# Patient Record
Sex: FEMALE | Race: BLACK OR AFRICAN AMERICAN | NOT HISPANIC OR LATINO | URBAN - METROPOLITAN AREA
[De-identification: names, ages, dates, MRNs, and addresses within clinical notes are randomized per-mention and may not be internally consistent; named-entity substitution may affect disease eponyms.]

---

## 2018-01-05 ENCOUNTER — OFFICE VISIT CONVERTED (OUTPATIENT)
Dept: SURGERY | Facility: CLINIC | Age: 38
End: 2018-01-05
Attending: SURGERY

## 2018-01-19 ENCOUNTER — OFFICE VISIT CONVERTED (OUTPATIENT)
Dept: SURGERY | Facility: CLINIC | Age: 38
End: 2018-01-19
Attending: SURGERY

## 2018-02-05 ENCOUNTER — OFFICE VISIT CONVERTED (OUTPATIENT)
Dept: SURGERY | Facility: CLINIC | Age: 38
End: 2018-02-05
Attending: SURGERY

## 2018-02-19 ENCOUNTER — OFFICE VISIT CONVERTED (OUTPATIENT)
Dept: SURGERY | Facility: CLINIC | Age: 38
End: 2018-02-19
Attending: SURGERY

## 2018-02-19 ENCOUNTER — CONVERSION ENCOUNTER (OUTPATIENT)
Dept: SURGERY | Facility: CLINIC | Age: 38
End: 2018-02-19

## 2018-03-05 ENCOUNTER — OFFICE VISIT CONVERTED (OUTPATIENT)
Dept: SURGERY | Facility: CLINIC | Age: 38
End: 2018-03-05
Attending: SURGERY

## 2018-04-02 ENCOUNTER — OFFICE VISIT CONVERTED (OUTPATIENT)
Dept: SURGERY | Facility: CLINIC | Age: 38
End: 2018-04-02
Attending: SURGERY

## 2018-04-02 ENCOUNTER — CONVERSION ENCOUNTER (OUTPATIENT)
Dept: SURGERY | Facility: CLINIC | Age: 38
End: 2018-04-02

## 2019-02-01 ENCOUNTER — OFFICE VISIT CONVERTED (OUTPATIENT)
Dept: PLASTIC SURGERY | Facility: CLINIC | Age: 39
End: 2019-02-01
Attending: PLASTIC SURGERY

## 2019-03-12 ENCOUNTER — OFFICE VISIT CONVERTED (OUTPATIENT)
Dept: SURGERY | Facility: CLINIC | Age: 39
End: 2019-03-12
Attending: SURGERY

## 2019-03-18 ENCOUNTER — OFFICE VISIT CONVERTED (OUTPATIENT)
Dept: SURGERY | Facility: CLINIC | Age: 39
End: 2019-03-18
Attending: SURGERY

## 2019-05-20 ENCOUNTER — OFFICE VISIT CONVERTED (OUTPATIENT)
Dept: PLASTIC SURGERY | Facility: CLINIC | Age: 39
End: 2019-05-20
Attending: PLASTIC SURGERY

## 2019-06-03 LAB — HCG UR QL: NEGATIVE

## 2019-06-04 ENCOUNTER — HOSPITAL ENCOUNTER (OUTPATIENT)
Dept: PERIOP | Facility: HOSPITAL | Age: 39
Setting detail: HOSPITAL OUTPATIENT SURGERY
Discharge: HOME | End: 2019-06-04
Attending: PLASTIC SURGERY

## 2019-06-05 ENCOUNTER — CONVERSION ENCOUNTER (OUTPATIENT)
Dept: PLASTIC SURGERY | Facility: CLINIC | Age: 39
End: 2019-06-05
Attending: PLASTIC SURGERY

## 2019-06-07 ENCOUNTER — OFFICE VISIT CONVERTED (OUTPATIENT)
Dept: PLASTIC SURGERY | Facility: CLINIC | Age: 39
End: 2019-06-07
Attending: PLASTIC SURGERY

## 2019-06-10 ENCOUNTER — OFFICE VISIT CONVERTED (OUTPATIENT)
Dept: PLASTIC SURGERY | Facility: CLINIC | Age: 39
End: 2019-06-10
Attending: PLASTIC SURGERY

## 2019-06-14 ENCOUNTER — CONVERSION ENCOUNTER (OUTPATIENT)
Dept: PLASTIC SURGERY | Facility: CLINIC | Age: 39
End: 2019-06-14

## 2019-06-14 ENCOUNTER — OFFICE VISIT CONVERTED (OUTPATIENT)
Dept: PLASTIC SURGERY | Facility: CLINIC | Age: 39
End: 2019-06-14
Attending: PLASTIC SURGERY

## 2019-07-12 ENCOUNTER — OFFICE VISIT CONVERTED (OUTPATIENT)
Dept: PLASTIC SURGERY | Facility: CLINIC | Age: 39
End: 2019-07-12
Attending: PLASTIC SURGERY

## 2019-08-27 ENCOUNTER — HOSPITAL ENCOUNTER (OUTPATIENT)
Dept: LAB | Facility: HOSPITAL | Age: 39
Discharge: HOME | End: 2019-08-27
Attending: FAMILY MEDICINE

## 2019-08-27 LAB
ALBUMIN SERPL-MCNC: 4.1 G/DL (ref 3.5–5)
ALBUMIN/GLOB SERPL: 0.9 {RATIO} (ref 1.4–2.6)
ALP SERPL-CCNC: 64 U/L (ref 42–98)
ALT SERPL-CCNC: 9 U/L (ref 10–40)
ANION GAP SERPL CALC-SCNC: 14 MMOL/L (ref 8–19)
AST SERPL-CCNC: 12 U/L (ref 15–50)
BASOPHILS # BLD AUTO: 0.05 10*3/UL (ref 0–0.2)
BASOPHILS NFR BLD AUTO: 0.5 % (ref 0–3)
BILIRUB SERPL-MCNC: 0.23 MG/DL (ref 0.2–1.3)
BUN SERPL-MCNC: 10 MG/DL (ref 5–25)
BUN/CREAT SERPL: 11 {RATIO} (ref 6–20)
CALCIUM SERPL-MCNC: 9.2 MG/DL (ref 8.7–10.4)
CHLORIDE SERPL-SCNC: 102 MMOL/L (ref 99–111)
CHOLEST SERPL-MCNC: 178 MG/DL (ref 107–200)
CHOLEST/HDLC SERPL: 4.7 {RATIO} (ref 3–6)
CONV ABS IMM GRAN: 0.02 10*3/UL (ref 0–0.2)
CONV CO2: 24 MMOL/L (ref 22–32)
CONV IMMATURE GRAN: 0.2 % (ref 0–1.8)
CONV TOTAL PROTEIN: 8.9 G/DL (ref 6.3–8.2)
CREAT UR-MCNC: 0.89 MG/DL (ref 0.5–0.9)
DEPRECATED RDW RBC AUTO: 41.1 FL (ref 36.4–46.3)
EOSINOPHIL # BLD AUTO: 0.25 10*3/UL (ref 0–0.7)
EOSINOPHIL # BLD AUTO: 2.7 % (ref 0–7)
ERYTHROCYTE [DISTWIDTH] IN BLOOD BY AUTOMATED COUNT: 13.3 % (ref 11.7–14.4)
GFR SERPLBLD BASED ON 1.73 SQ M-ARVRAT: >60 ML/MIN/{1.73_M2}
GLOBULIN UR ELPH-MCNC: 4.8 G/DL (ref 2–3.5)
GLUCOSE SERPL-MCNC: 83 MG/DL (ref 65–99)
HCT VFR BLD AUTO: 34.5 % (ref 37–47)
HDLC SERPL-MCNC: 38 MG/DL (ref 40–60)
HGB BLD-MCNC: 11.2 G/DL (ref 12–16)
IRON SATN MFR SERPL: 21 % (ref 20–55)
IRON SERPL-MCNC: 75 UG/DL (ref 60–170)
LDLC SERPL CALC-MCNC: 124 MG/DL (ref 70–100)
LYMPHOCYTES # BLD AUTO: 3.53 10*3/UL (ref 1–5)
LYMPHOCYTES NFR BLD AUTO: 38 % (ref 20–45)
MCH RBC QN AUTO: 27.5 PG (ref 27–31)
MCHC RBC AUTO-ENTMCNC: 32.5 G/DL (ref 33–37)
MCV RBC AUTO: 84.8 FL (ref 81–99)
MONOCYTES # BLD AUTO: 0.68 10*3/UL (ref 0.2–1.2)
MONOCYTES NFR BLD AUTO: 7.3 % (ref 3–10)
NEUTROPHILS # BLD AUTO: 4.75 10*3/UL (ref 2–8)
NEUTROPHILS NFR BLD AUTO: 51.3 % (ref 30–85)
NRBC CBCN: 0 % (ref 0–0.7)
OSMOLALITY SERPL CALC.SUM OF ELEC: 280 MOSM/KG (ref 273–304)
PLATELET # BLD AUTO: 347 10*3/UL (ref 130–400)
PMV BLD AUTO: 10.1 FL (ref 9.4–12.3)
POTASSIUM SERPL-SCNC: 4 MMOL/L (ref 3.5–5.3)
RBC # BLD AUTO: 4.07 10*6/UL (ref 4.2–5.4)
SODIUM SERPL-SCNC: 136 MMOL/L (ref 135–147)
TIBC SERPL-MCNC: 363 UG/DL (ref 245–450)
TRANSFERRIN SERPL-MCNC: 254 MG/DL (ref 250–380)
TRIGL SERPL-MCNC: 78 MG/DL (ref 40–150)
VLDLC SERPL-MCNC: 16 MG/DL (ref 5–37)
WBC # BLD AUTO: 9.28 10*3/UL (ref 4.8–10.8)

## 2019-09-13 ENCOUNTER — CONVERSION ENCOUNTER (OUTPATIENT)
Dept: PLASTIC SURGERY | Facility: CLINIC | Age: 39
End: 2019-09-13

## 2019-09-13 ENCOUNTER — OFFICE VISIT CONVERTED (OUTPATIENT)
Dept: PLASTIC SURGERY | Facility: CLINIC | Age: 39
End: 2019-09-13
Attending: PLASTIC SURGERY

## 2019-09-22 ENCOUNTER — HOSPITAL ENCOUNTER (OUTPATIENT)
Dept: URGENT CARE | Facility: CLINIC | Age: 39
Discharge: HOME | End: 2019-09-22

## 2019-10-07 ENCOUNTER — OFFICE VISIT CONVERTED (OUTPATIENT)
Dept: SURGERY | Facility: CLINIC | Age: 39
End: 2019-10-07
Attending: SURGERY

## 2019-10-11 ENCOUNTER — HOSPITAL ENCOUNTER (OUTPATIENT)
Dept: URGENT CARE | Facility: CLINIC | Age: 39
Discharge: HOME | End: 2019-10-11

## 2019-10-18 ENCOUNTER — OFFICE VISIT CONVERTED (OUTPATIENT)
Dept: PLASTIC SURGERY | Facility: CLINIC | Age: 39
End: 2019-10-18
Attending: PLASTIC SURGERY

## 2019-10-31 ENCOUNTER — HOSPITAL ENCOUNTER (OUTPATIENT)
Dept: PERIOP | Facility: HOSPITAL | Age: 39
Setting detail: HOSPITAL OUTPATIENT SURGERY
Discharge: HOME | End: 2019-10-31
Attending: SURGERY

## 2019-10-31 LAB — HCG UR QL: NEGATIVE

## 2019-11-01 ENCOUNTER — HOSPITAL ENCOUNTER (OUTPATIENT)
Dept: INFUSION THERAPY | Facility: HOSPITAL | Age: 39
Setting detail: RECURRING SERIES
Discharge: HOME | End: 2019-11-30
Attending: SURGERY

## 2019-11-08 ENCOUNTER — OFFICE VISIT CONVERTED (OUTPATIENT)
Dept: SURGERY | Facility: CLINIC | Age: 39
End: 2019-11-08
Attending: SURGERY

## 2019-11-22 ENCOUNTER — OFFICE VISIT CONVERTED (OUTPATIENT)
Dept: SURGERY | Facility: CLINIC | Age: 39
End: 2019-11-22
Attending: SURGERY

## 2019-12-01 ENCOUNTER — HOSPITAL ENCOUNTER (OUTPATIENT)
Dept: INFUSION THERAPY | Facility: HOSPITAL | Age: 39
Setting detail: RECURRING SERIES
Discharge: HOME | End: 2019-12-31
Attending: SURGERY

## 2019-12-20 ENCOUNTER — OFFICE VISIT CONVERTED (OUTPATIENT)
Dept: SURGERY | Facility: CLINIC | Age: 39
End: 2019-12-20
Attending: SURGERY

## 2019-12-26 ENCOUNTER — HOSPITAL ENCOUNTER (OUTPATIENT)
Dept: PERIOP | Facility: HOSPITAL | Age: 39
Setting detail: HOSPITAL OUTPATIENT SURGERY
Discharge: HOME | End: 2019-12-26
Attending: SURGERY

## 2019-12-26 LAB — HCG UR QL: NEGATIVE

## 2020-01-01 ENCOUNTER — HOSPITAL ENCOUNTER (OUTPATIENT)
Dept: INFUSION THERAPY | Facility: HOSPITAL | Age: 40
Setting detail: RECURRING SERIES
Discharge: HOME | End: 2020-01-31
Attending: SURGERY

## 2020-01-10 ENCOUNTER — OFFICE VISIT CONVERTED (OUTPATIENT)
Dept: SURGERY | Facility: CLINIC | Age: 40
End: 2020-01-10
Attending: SURGERY

## 2020-01-13 ENCOUNTER — OFFICE VISIT CONVERTED (OUTPATIENT)
Dept: SURGERY | Facility: CLINIC | Age: 40
End: 2020-01-13
Attending: SURGERY

## 2020-01-21 ENCOUNTER — OFFICE VISIT CONVERTED (OUTPATIENT)
Dept: SURGERY | Facility: CLINIC | Age: 40
End: 2020-01-21
Attending: NURSE PRACTITIONER

## 2020-01-21 ENCOUNTER — HOSPITAL ENCOUNTER (OUTPATIENT)
Dept: SURGERY | Facility: CLINIC | Age: 40
Discharge: HOME | End: 2020-01-21
Attending: NURSE PRACTITIONER

## 2020-01-22 ENCOUNTER — CONVERSION ENCOUNTER (OUTPATIENT)
Dept: SURGERY | Facility: CLINIC | Age: 40
End: 2020-01-22

## 2020-01-23 LAB — BACTERIA SPEC AEROBE CULT: ABNORMAL

## 2020-02-03 ENCOUNTER — HOSPITAL ENCOUNTER (OUTPATIENT)
Dept: INFUSION THERAPY | Facility: HOSPITAL | Age: 40
Setting detail: RECURRING SERIES
Discharge: HOME | End: 2020-02-22
Attending: SURGERY

## 2020-02-07 ENCOUNTER — OFFICE VISIT CONVERTED (OUTPATIENT)
Dept: PLASTIC SURGERY | Facility: CLINIC | Age: 40
End: 2020-02-07
Attending: PLASTIC SURGERY

## 2020-02-07 ENCOUNTER — OFFICE VISIT CONVERTED (OUTPATIENT)
Dept: SURGERY | Facility: CLINIC | Age: 40
End: 2020-02-07
Attending: SURGERY

## 2020-02-14 ENCOUNTER — CONVERSION ENCOUNTER (OUTPATIENT)
Dept: SURGERY | Facility: CLINIC | Age: 40
End: 2020-02-14

## 2020-02-14 ENCOUNTER — OFFICE VISIT CONVERTED (OUTPATIENT)
Dept: SURGERY | Facility: CLINIC | Age: 40
End: 2020-02-14
Attending: SURGERY

## 2020-02-28 ENCOUNTER — OFFICE VISIT CONVERTED (OUTPATIENT)
Dept: SURGERY | Facility: CLINIC | Age: 40
End: 2020-02-28
Attending: SURGERY

## 2020-04-10 ENCOUNTER — OFFICE VISIT CONVERTED (OUTPATIENT)
Dept: SURGERY | Facility: CLINIC | Age: 40
End: 2020-04-10
Attending: SURGERY

## 2020-04-10 ENCOUNTER — CONVERSION ENCOUNTER (OUTPATIENT)
Dept: SURGERY | Facility: CLINIC | Age: 40
End: 2020-04-10

## 2020-06-12 ENCOUNTER — OFFICE VISIT CONVERTED (OUTPATIENT)
Dept: PLASTIC SURGERY | Facility: CLINIC | Age: 40
End: 2020-06-12
Attending: PLASTIC SURGERY

## 2020-06-12 ENCOUNTER — CONVERSION ENCOUNTER (OUTPATIENT)
Dept: PLASTIC SURGERY | Facility: CLINIC | Age: 40
End: 2020-06-12

## 2020-07-09 ENCOUNTER — CONVERSION ENCOUNTER (OUTPATIENT)
Dept: PLASTIC SURGERY | Facility: CLINIC | Age: 40
End: 2020-07-09

## 2020-07-09 ENCOUNTER — PROCEDURE VISIT CONVERTED (OUTPATIENT)
Dept: PLASTIC SURGERY | Facility: CLINIC | Age: 40
End: 2020-07-09
Attending: PLASTIC SURGERY

## 2020-07-23 ENCOUNTER — OFFICE VISIT CONVERTED (OUTPATIENT)
Dept: PLASTIC SURGERY | Facility: CLINIC | Age: 40
End: 2020-07-23
Attending: PLASTIC SURGERY

## 2020-07-23 ENCOUNTER — CONVERSION ENCOUNTER (OUTPATIENT)
Dept: PLASTIC SURGERY | Facility: CLINIC | Age: 40
End: 2020-07-23

## 2020-09-25 ENCOUNTER — OFFICE VISIT CONVERTED (OUTPATIENT)
Dept: PLASTIC SURGERY | Facility: CLINIC | Age: 40
End: 2020-09-25
Attending: PLASTIC SURGERY

## 2020-09-25 ENCOUNTER — CONVERSION ENCOUNTER (OUTPATIENT)
Dept: PLASTIC SURGERY | Facility: CLINIC | Age: 40
End: 2020-09-25

## 2020-10-23 ENCOUNTER — HOSPITAL ENCOUNTER (OUTPATIENT)
Dept: LAB | Facility: HOSPITAL | Age: 40
Discharge: HOME | End: 2020-10-23
Attending: FAMILY MEDICINE

## 2020-10-23 LAB
ALBUMIN SERPL-MCNC: 4 G/DL (ref 3.5–5)
ALBUMIN/GLOB SERPL: 0.9 {RATIO} (ref 1.4–2.6)
ALP SERPL-CCNC: 53 U/L (ref 42–98)
ALT SERPL-CCNC: 10 U/L (ref 10–40)
ANION GAP SERPL CALC-SCNC: 16 MMOL/L (ref 8–19)
AST SERPL-CCNC: 14 U/L (ref 15–50)
BASOPHILS # BLD AUTO: 0.04 10*3/UL (ref 0–0.2)
BASOPHILS NFR BLD AUTO: 0.5 % (ref 0–3)
BILIRUB SERPL-MCNC: 0.3 MG/DL (ref 0.2–1.3)
BUN SERPL-MCNC: 11 MG/DL (ref 5–25)
BUN/CREAT SERPL: 12 {RATIO} (ref 6–20)
CALCIUM SERPL-MCNC: 9.3 MG/DL (ref 8.7–10.4)
CHLORIDE SERPL-SCNC: 106 MMOL/L (ref 99–111)
CHOLEST SERPL-MCNC: 200 MG/DL (ref 107–200)
CHOLEST/HDLC SERPL: 5 {RATIO} (ref 3–6)
CONV ABS IMM GRAN: 0.01 10*3/UL (ref 0–0.2)
CONV CO2: 21 MMOL/L (ref 22–32)
CONV IMMATURE GRAN: 0.1 % (ref 0–1.8)
CONV TOTAL PROTEIN: 8.4 G/DL (ref 6.3–8.2)
CREAT UR-MCNC: 0.89 MG/DL (ref 0.5–0.9)
DEPRECATED RDW RBC AUTO: 42.1 FL (ref 36.4–46.3)
EOSINOPHIL # BLD AUTO: 0.2 10*3/UL (ref 0–0.7)
EOSINOPHIL # BLD AUTO: 2.5 % (ref 0–7)
ERYTHROCYTE [DISTWIDTH] IN BLOOD BY AUTOMATED COUNT: 13.5 % (ref 11.7–14.4)
GFR SERPLBLD BASED ON 1.73 SQ M-ARVRAT: >60 ML/MIN/{1.73_M2}
GLOBULIN UR ELPH-MCNC: 4.4 G/DL (ref 2–3.5)
GLUCOSE SERPL-MCNC: 82 MG/DL (ref 65–99)
HCT VFR BLD AUTO: 34.6 % (ref 37–47)
HDLC SERPL-MCNC: 40 MG/DL (ref 40–60)
HGB BLD-MCNC: 11.3 G/DL (ref 12–16)
IRON SATN MFR SERPL: 17 % (ref 20–55)
IRON SERPL-MCNC: 60 UG/DL (ref 60–170)
LDLC SERPL CALC-MCNC: 148 MG/DL (ref 70–100)
LYMPHOCYTES # BLD AUTO: 3.07 10*3/UL (ref 1–5)
LYMPHOCYTES NFR BLD AUTO: 38 % (ref 20–45)
MCH RBC QN AUTO: 27.6 PG (ref 27–31)
MCHC RBC AUTO-ENTMCNC: 32.7 G/DL (ref 33–37)
MCV RBC AUTO: 84.4 FL (ref 81–99)
MONOCYTES # BLD AUTO: 0.65 10*3/UL (ref 0.2–1.2)
MONOCYTES NFR BLD AUTO: 8.1 % (ref 3–10)
NEUTROPHILS # BLD AUTO: 4.1 10*3/UL (ref 2–8)
NEUTROPHILS NFR BLD AUTO: 50.8 % (ref 30–85)
NRBC CBCN: 0 % (ref 0–0.7)
OSMOLALITY SERPL CALC.SUM OF ELEC: 286 MOSM/KG (ref 273–304)
PLATELET # BLD AUTO: 351 10*3/UL (ref 130–400)
PMV BLD AUTO: 10.1 FL (ref 9.4–12.3)
POTASSIUM SERPL-SCNC: 4.3 MMOL/L (ref 3.5–5.3)
RBC # BLD AUTO: 4.1 10*6/UL (ref 4.2–5.4)
SODIUM SERPL-SCNC: 139 MMOL/L (ref 135–147)
TIBC SERPL-MCNC: 350 UG/DL (ref 245–450)
TRANSFERRIN SERPL-MCNC: 245 MG/DL (ref 250–380)
TRIGL SERPL-MCNC: 60 MG/DL (ref 40–150)
VLDLC SERPL-MCNC: 12 MG/DL (ref 5–37)
WBC # BLD AUTO: 8.07 10*3/UL (ref 4.8–10.8)

## 2021-05-10 NOTE — H&P
"   History and Physical      Patient Name: Pattie Antoine   Patient ID: [id number]   Sex: Female   YOB: 1980    Primary Care Provider: [place] Conn MD   Referring Provider: Young Sanchez MD    Visit Date: April 10, 2020    Provider: Young Sanchez MD   Location: [facility]   Location Address: [address] [place] [address]  [id number]   Location Phone: [phone]          Chief Complaint  · Outpatient History & Physical / Surgical Orders  · Follow Up      History Of Present Illness     Pattie came back for follow-up. She is doing well but has a little induration in her left axilla. Otherwise, she is feeling okay.       Past Medical History  Allergies; Hidradenitis; Mass         Past Surgical History  Abdominoplasty; Cesarian Section; Hidradenitis incision and drainage; Incision and Drainage of Abscess; Pilonidal cystectomy         Medication List  Birth Control; Humira Pen 40 mg/0.8 mL subcutaneous pen injector kit         Allergy List  Bactrim         Family Medical History  Heart Disease; Renal Calculus; Diabetes         Social History  Alcohol (Never); Caffeine (Current some day); Second hand smoke exposure (Never); Tobacco (Never)         Vitals  Date Time BP Position Site L\R Cuff Size HR RR TEMP (F) WT  HT  BMI kg/m2 BSA m2 O2 Sat HC       04/10/2020 09:05 AM       12  180lbs 0oz 5'  4\" 30.9 1.92           Physical Examination  · Constitutional  o Appearance  o : well-nourished, well developed, alert, in no acute distress  · Head and Face  o Head  o :   § Inspection  § : atraumatic, normocephalic  · Neck  o Inspection/Palpation  o : supple, normal range of motion  · Respiratory  o Inspection of Chest  o : normal inspection  o Auscultation of Lungs  o : breath sounds normal, no distress, clear to ascultate bilaterally  · Cardiovascular  o Heart  o :   § Auscultation of Heart  § : regular rate and rhythm, no murmur, gallop or rub  · Gastrointestinal  o Abdominal Examination  o : normal " bowel sounds, non-tender, soft  · Extremities  o Extremities  o : Small focus of left axillary hidradenitis noted          Assessment  · Pre-Surgical Orders     V72.84  · Hidradenitis axillaris     705.83/L73.2       Generally doing well following extensive skin excisions of both axillae and both groins. She has one small area in her left axilla below the old incision that has a little bit of activity there.       Plan  · Orders  o Surgery Order ([facility]) - - 04/10/2020  · Medications  o Medications have been Reconciled  o Transition of Care or Provider Policy  · Instructions  o ****Surgical Orders****  o Outpatient  o RISK AND BENEFITS:  o Consent for surgery: Given these options, the patient has verbally expressed an understanding of the risks of surgery and finds these risks acceptable. We will proceed with surgery as soon as possible.  o Consult Anesthesia for any post-operative block, or any pain management procedure deemed necessary by the anestesiologist for adequate post-operative pain control.   o O.R. PREP: Per protocol  o SCD's preoperatively  o PLEASE SIGN PERMIT FOR: Left axillary incision drainage  o *___The above History and Physical Examination has been completed within 30 days of admission.     I have told Pattie that I think it probably would make sense for us to take her to the OR and do a more extensive incision and drainage procedure than what we can do here in the office and she is fine with that. We will get her set up to do that when she would like. She is going to check with her  and they will decide on a date.             Electronically Signed by: Madison Brown-, -Author on April 13, 2020 11:30:23 AM  Electronically Co-signed by: Young Sanchez MD -Reviewer on April 14, 2020 12:43:32 PM

## 2021-05-13 NOTE — PROGRESS NOTES
Progress Note      Patient Name: Pattie Antoine   Patient ID: [id number]   Sex: Female   YOB: 1980    Primary Care Provider: [place] Conn MD   Referring Provider: Young Sanchez MD    Visit Date: September 25, 2020    Provider: Silvana Chappell MD   Location: [facility]   Location Address: [address] [place] [address]  [id number]   Location Phone: [phone]          Chief Complaint  · Follow up       History Of Present Illness  Pattie Antoine is a 38 year old /Black female who presents to the office for liposuction and abdominoplasty post op. doing well, compliant with compression, pleased with results so far s/p Left lateral dog ear excision 7/9/20.       Past Medical History  Allergies; Hidradenitis; Mass         Past Surgical History  Abdominoplasty; Cesarian Section; Hidradenitis incision and drainage; Incision and Drainage of Abscess; Pilonidal cystectomy         Medication List  Birth Control; Humira Pen 40 mg/0.8 mL subcutaneous pen injector kit         Allergy List  Bactrim         Family Medical History  Heart Disease; Renal Calculus; Diabetes         Social History  Alcohol (Never); Caffeine (Current some day); Second hand smoke exposure (Never); Tobacco (Never)         Vitals  Date Time BP Position Site L\R Cuff Size HR RR TEMP (F) WT  HT  BMI kg/m2 BSA m2 O2 Sat HC       09/25/2020 11:42 /98 Sitting    59 - R  97.8     100 %          Physical Examination  · Constitutional  o Appearance  o : well developed, well-nourished, Alert and oriented X3  · Respiratory  o Respiratory Effort  o : breathing unlabored  · Skin and Subcutaneous Tissue  o Surgical Incision  o : well healed incision of left lower abd; improved contour          Assessment  · Postoperative follow-up     V67.00/Z09      Plan  · Orders  o Plastics cosmetic visit ([facility]OS) - - 09/25/2020  · Medications  o Medications have been Reconciled  o Transition of Care or  Provider Policy  · Instructions  o aquaphor and scar massage, rtc prn            Electronically Signed by: Silvana Chappell MD -Author on September 25, 2020 02:35:29 PM

## 2021-05-13 NOTE — PROGRESS NOTES
"   Progress Note      Patient Name: Pattie Antoine   Patient ID: [id number]   Sex: Female   YOB: 1980    Primary Care Provider: [place] Conn MD   Referring Provider: Young Sanchez MD    Visit Date: June 12, 2020    Provider: Silvana Chappell MD   Location: [facility]   Location Address: [address] [place] [address]  [id number]   Location Phone: [phone]          History Of Present Illness  Pattie Antoine is a 38 year old /Black female who presents to the office for liposuction and abdominoplasty post op. doing well, compliant with compression, pleased with results so far, rash around belly button has resolved with aquaphor, reports persistent fullness laterally left > right;       Past Medical History  Allergies; Hidradenitis; Mass         Past Surgical History  Abdominoplasty; Cesarian Section; Hidradenitis incision and drainage; Incision and Drainage of Abscess; Pilonidal cystectomy         Medication List  Birth Control; Humira Pen 40 mg/0.8 mL subcutaneous pen injector kit         Allergy List  Bactrim         Family Medical History  Heart Disease; Renal Calculus; Diabetes         Social History  Alcohol (Never); Caffeine (Current some day); Second hand smoke exposure (Never); Tobacco (Never)         Vitals  Date Time BP Position Site L\R Cuff Size HR RR TEMP (F) WT  HT  BMI kg/m2 BSA m2 O2 Sat        06/12/2020 02:34 /94 Sitting    96 - R  98.2 182lbs 4oz 5'  4\" 31.28 1.93 98 %          Physical Examination  · Constitutional  o Appearance  o : well developed, well-nourished, Alert and oriented X3  · Respiratory  o Respiratory Effort  o : breathing unlabored  · Skin and Subcutaneous Tissue  o Surgical Incision  o : improved contour and swelling, incision healing well, improved skin around umbilicus, hyperpigmentation has improved,, mild fullness at lateral aspects of incisions, left > right; "               Assessment  · Postoperative follow-up     V67.00/Z09      Plan  · Orders  o Plastics cosmetic visit ([facility]OS) - - 06/12/2020  · Medications  o Medications have been Reconciled  o Transition of Care or Provider Policy  · Instructions  o aquaphor and scar massage, plan on left lateral scar revision in office, get pics at that time            Electronically Signed by: Silvana Chappell MD -Author on June 12, 2020 03:54:59 PM

## 2021-05-13 NOTE — PROGRESS NOTES
"   Progress Note      Patient Name: Pattie Antoine   Patient ID: [id number]   Sex: Female   YOB: 1980    Primary Care Provider: [place] Conn MD   Referring Provider: Young Sanchez MD    Visit Date: July 23, 2020    Provider: Silvana Chappell MD   Location: [facility]   Location Address: [address] [place] [address]  [id number]   Location Phone: [phone]          Chief Complaint  · Follow up       History Of Present Illness  Pattie Antoine is a 38 year old /Black female who presents to the office for liposuction and abdominoplasty post op. doing well, compliant with compression, pleased with results so far s/p Left lateral dog ear excision 7/9/20.       Past Medical History  Allergies; Hidradenitis; Mass         Past Surgical History  Abdominoplasty; Cesarian Section; Hidradenitis incision and drainage; Incision and Drainage of Abscess; Pilonidal cystectomy         Medication List  Birth Control; Humira Pen 40 mg/0.8 mL subcutaneous pen injector kit         Allergy List  Bactrim       Allergies Reconciled  Family Medical History  Heart Disease; Renal Calculus; Diabetes         Social History  Alcohol (Never); Caffeine (Current some day); Second hand smoke exposure (Never); Tobacco (Never)         Vitals  Date Time BP Position Site L\R Cuff Size HR RR TEMP (F) WT  HT  BMI kg/m2 BSA m2 O2 Sat HC       07/23/2020 10:00 /87 Sitting    64 - R  97.9 183lbs 0oz 5'  4\" 31.41 1.94 99 %          Physical Examination  · Constitutional  o Appearance  o : well developed, well-nourished, Alert and oriented X3  · Respiratory  o Respiratory Effort  o : breathing unlabored  · Skin and Subcutaneous Tissue  o Surgical Incision  o : well healed incision of left lower abd; improved contour          Assessment  · Postoperative follow-up     V67.00/Z09      Plan  · Orders  o Plastics cosmetic visit (PSCOS) - - 07/23/2020  · Medications  o Medications have " been Reconciled  o Transition of Care or Provider Policy  · Instructions  o Changed steri-strip, Start aquaphor and scar massage at one month, RTC 2 months            Electronically Signed by: Silvana Chappell MD -Author on July 23, 2020 11:10:58 AM

## 2021-05-13 NOTE — PROGRESS NOTES
"   Progress Note      Patient Name: Pattie Antoine   Patient ID: [id number]   Sex: Female   YOB: 1980    Primary Care Provider: [place] Conn MD   Referring Provider: Young Sanchez MD    Visit Date: July 9, 2020    Provider: Silvana Chappell MD   Location: [facility]   Location Address: [address] [place] [address]  [id number]   Location Phone: [phone]          History Of Present Illness  Pattie Antoine is a 38 year old /Black female who presents to the office for liposuction and abdominoplasty post op. doing well, compliant with compression, pleased with results so far, rash around belly button has resolved with aquaphor, reports persistent fullness laterally left > right. Here today for left lateral dog ear excision       Past Medical History  Allergies; Hidradenitis; Mass         Past Surgical History  Abdominoplasty; Cesarian Section; Hidradenitis incision and drainage; Incision and Drainage of Abscess; Pilonidal cystectomy         Medication List  Birth Control; Humira Pen 40 mg/0.8 mL subcutaneous pen injector kit         Allergy List  Bactrim       Allergies Reconciled  Family Medical History  Heart Disease; Renal Calculus; Diabetes         Social History  Alcohol (Never); Caffeine (Current some day); Second hand smoke exposure (Never); Tobacco (Never)         Vitals  Date Time BP Position Site L\R Cuff Size HR RR TEMP (F) WT  HT  BMI kg/m2 BSA m2 O2 Sat        07/09/2020 10:33 /88 Sitting    67 - R  98.3  5'  4\"   100 %          Physical Examination  · Constitutional  o Appearance  o : well developed, well-nourished, Alert and oriented X3  · Respiratory  o Respiratory Effort  o : breathing unlabored  · Skin and Subcutaneous Tissue  o Surgical Incision  o : well healed incision, mild fullness at lateral aspects of incisions on left           Assessment  · Postoperative follow-up     V67.00/Z09      Plan  · Orders  o Plastics " cosmetic visit (PSCOS) - - 07/09/2020  o NO CHARGE (NOCHG) - - 07/09/2020   cosmetic revision policy  · Medications  o Medications have been Reconciled  o Transition of Care or Provider Policy  · Instructions  o negative cotinine screen before procedure, signed covid consent  o Consent obtained. Local injected to site, Lidocaine 1% with epi. prepped with chloroprep in sterile fashion. Site draped in sterile fashion. I dissected down through skin and subcutaneous tissue completely around excess skin. Established hemostasis with direct pressure and cautery. Site was thoroughly irrigated. Site closed with 3-0 monocryl in a subcutaneous fashion to obliterate dead space, then with 4-0 monocryl in a running intracuticular fashion. Site cleaned with sterile normal saline. Mastisol and steri strips applied. The patient tolerated the procedure well with no immediate complications.             Electronically Signed by: Silvana Chappell MD -Author on July 9, 2020 02:11:01 PM

## 2021-05-14 VITALS
HEART RATE: 59 BPM | SYSTOLIC BLOOD PRESSURE: 144 MMHG | TEMPERATURE: 97.8 F | OXYGEN SATURATION: 100 % | DIASTOLIC BLOOD PRESSURE: 98 MMHG

## 2021-05-15 VITALS — HEIGHT: 64 IN | WEIGHT: 180 LBS | BODY MASS INDEX: 30.73 KG/M2 | RESPIRATION RATE: 12 BRPM

## 2021-05-15 VITALS
SYSTOLIC BLOOD PRESSURE: 133 MMHG | OXYGEN SATURATION: 99 % | TEMPERATURE: 97.9 F | DIASTOLIC BLOOD PRESSURE: 87 MMHG | WEIGHT: 183 LBS | HEART RATE: 64 BPM | HEIGHT: 64 IN | BODY MASS INDEX: 31.24 KG/M2

## 2021-05-15 VITALS
HEIGHT: 63 IN | HEART RATE: 60 BPM | WEIGHT: 175 LBS | BODY MASS INDEX: 31.01 KG/M2 | SYSTOLIC BLOOD PRESSURE: 134 MMHG | DIASTOLIC BLOOD PRESSURE: 79 MMHG | OXYGEN SATURATION: 100 %

## 2021-05-15 VITALS — RESPIRATION RATE: 16 BRPM | HEIGHT: 63 IN | BODY MASS INDEX: 31.01 KG/M2 | WEIGHT: 175 LBS

## 2021-05-15 VITALS
OXYGEN SATURATION: 99 % | BODY MASS INDEX: 31.01 KG/M2 | HEART RATE: 85 BPM | DIASTOLIC BLOOD PRESSURE: 87 MMHG | HEIGHT: 63 IN | SYSTOLIC BLOOD PRESSURE: 146 MMHG | WEIGHT: 175 LBS

## 2021-05-15 VITALS — BODY MASS INDEX: 31.18 KG/M2 | RESPIRATION RATE: 16 BRPM | HEIGHT: 63 IN | WEIGHT: 176 LBS

## 2021-05-15 VITALS — RESPIRATION RATE: 14 BRPM | BODY MASS INDEX: 30.56 KG/M2 | HEIGHT: 64 IN | WEIGHT: 179 LBS

## 2021-05-15 VITALS
WEIGHT: 178 LBS | SYSTOLIC BLOOD PRESSURE: 130 MMHG | HEART RATE: 64 BPM | DIASTOLIC BLOOD PRESSURE: 82 MMHG | HEIGHT: 63 IN | BODY MASS INDEX: 31.54 KG/M2 | OXYGEN SATURATION: 100 %

## 2021-05-15 VITALS — WEIGHT: 182 LBS | HEART RATE: 60 BPM | HEIGHT: 64 IN | BODY MASS INDEX: 31.07 KG/M2

## 2021-05-15 VITALS — WEIGHT: 177 LBS | BODY MASS INDEX: 31.36 KG/M2 | HEIGHT: 63 IN | RESPIRATION RATE: 16 BRPM

## 2021-05-15 VITALS
DIASTOLIC BLOOD PRESSURE: 83 MMHG | WEIGHT: 175.25 LBS | SYSTOLIC BLOOD PRESSURE: 128 MMHG | OXYGEN SATURATION: 100 % | HEIGHT: 63 IN | HEART RATE: 71 BPM | BODY MASS INDEX: 31.05 KG/M2

## 2021-05-15 VITALS — HEIGHT: 64 IN | BODY MASS INDEX: 30.96 KG/M2 | WEIGHT: 181.37 LBS | RESPIRATION RATE: 12 BRPM

## 2021-05-15 VITALS — BODY MASS INDEX: 30.73 KG/M2 | RESPIRATION RATE: 18 BRPM | WEIGHT: 180 LBS | HEIGHT: 64 IN

## 2021-05-15 VITALS — RESPIRATION RATE: 14 BRPM | BODY MASS INDEX: 30.65 KG/M2 | WEIGHT: 173 LBS | HEIGHT: 63 IN

## 2021-05-15 VITALS — BODY MASS INDEX: 31.54 KG/M2 | HEIGHT: 63 IN | RESPIRATION RATE: 16 BRPM | WEIGHT: 178 LBS

## 2021-05-15 VITALS
BODY MASS INDEX: 31.12 KG/M2 | TEMPERATURE: 98.2 F | HEIGHT: 64 IN | SYSTOLIC BLOOD PRESSURE: 143 MMHG | WEIGHT: 182.25 LBS | DIASTOLIC BLOOD PRESSURE: 94 MMHG | OXYGEN SATURATION: 98 % | HEART RATE: 96 BPM

## 2021-05-15 VITALS
DIASTOLIC BLOOD PRESSURE: 82 MMHG | WEIGHT: 175 LBS | HEIGHT: 63 IN | BODY MASS INDEX: 31.01 KG/M2 | HEART RATE: 79 BPM | OXYGEN SATURATION: 99 % | SYSTOLIC BLOOD PRESSURE: 144 MMHG

## 2021-05-15 VITALS — BODY MASS INDEX: 29.19 KG/M2 | WEIGHT: 171 LBS | HEIGHT: 64 IN | RESPIRATION RATE: 16 BRPM

## 2021-05-15 VITALS
WEIGHT: 178.25 LBS | SYSTOLIC BLOOD PRESSURE: 134 MMHG | HEART RATE: 68 BPM | BODY MASS INDEX: 31.58 KG/M2 | HEIGHT: 63 IN | OXYGEN SATURATION: 98 % | DIASTOLIC BLOOD PRESSURE: 80 MMHG

## 2021-05-15 VITALS — BODY MASS INDEX: 31.18 KG/M2 | RESPIRATION RATE: 14 BRPM | WEIGHT: 176 LBS | HEIGHT: 63 IN

## 2021-05-15 VITALS
OXYGEN SATURATION: 100 % | HEART RATE: 67 BPM | HEIGHT: 64 IN | DIASTOLIC BLOOD PRESSURE: 88 MMHG | TEMPERATURE: 98.3 F | SYSTOLIC BLOOD PRESSURE: 155 MMHG

## 2021-05-16 VITALS — RESPIRATION RATE: 16 BRPM | BODY MASS INDEX: 29.71 KG/M2 | WEIGHT: 174 LBS | HEIGHT: 64 IN

## 2021-05-16 VITALS — WEIGHT: 176 LBS | HEIGHT: 64 IN | BODY MASS INDEX: 30.05 KG/M2 | RESPIRATION RATE: 18 BRPM

## 2021-05-16 VITALS — HEIGHT: 64 IN | RESPIRATION RATE: 14 BRPM | BODY MASS INDEX: 30.05 KG/M2 | WEIGHT: 176 LBS

## 2021-05-16 VITALS — WEIGHT: 173 LBS | RESPIRATION RATE: 16 BRPM | BODY MASS INDEX: 29.53 KG/M2 | HEIGHT: 64 IN

## 2021-05-16 VITALS — HEIGHT: 64 IN | BODY MASS INDEX: 30.05 KG/M2 | WEIGHT: 176 LBS | RESPIRATION RATE: 14 BRPM

## 2021-05-16 VITALS — BODY MASS INDEX: 29.88 KG/M2 | HEIGHT: 64 IN | WEIGHT: 175 LBS | RESPIRATION RATE: 14 BRPM

## 2021-05-16 VITALS — BODY MASS INDEX: 30.05 KG/M2 | RESPIRATION RATE: 14 BRPM | WEIGHT: 176 LBS | HEIGHT: 64 IN

## 2021-05-16 VITALS
BODY MASS INDEX: 29.37 KG/M2 | HEART RATE: 84 BPM | SYSTOLIC BLOOD PRESSURE: 135 MMHG | WEIGHT: 172 LBS | OXYGEN SATURATION: 100 % | DIASTOLIC BLOOD PRESSURE: 85 MMHG | HEIGHT: 64 IN

## 2022-09-15 ENCOUNTER — TRANSCRIBE ORDERS (OUTPATIENT)
Dept: ADMINISTRATIVE | Facility: HOSPITAL | Age: 42
End: 2022-09-15

## 2022-09-15 DIAGNOSIS — Z12.31 ENCOUNTER FOR SCREENING MAMMOGRAM FOR MALIGNANT NEOPLASM OF BREAST: Primary | ICD-10-CM

## 2022-12-01 ENCOUNTER — HOSPITAL ENCOUNTER (OUTPATIENT)
Dept: MAMMOGRAPHY | Facility: HOSPITAL | Age: 42
Discharge: HOME | End: 2022-12-01
Admitting: SPECIALIST

## 2022-12-01 DIAGNOSIS — Z12.31 ENCOUNTER FOR SCREENING MAMMOGRAM FOR MALIGNANT NEOPLASM OF BREAST: ICD-10-CM

## 2022-12-01 PROCEDURE — 77063 BREAST TOMOSYNTHESIS BI: CPT

## 2022-12-01 PROCEDURE — 77067 SCR MAMMO BI INCL CAD: CPT

## 2023-08-28 ENCOUNTER — OFFICE VISIT (OUTPATIENT)
Dept: SURGERY | Facility: CLINIC | Age: 43
End: 2023-08-28
Payer: COMMERCIAL

## 2023-08-28 VITALS — BODY MASS INDEX: 33.12 KG/M2 | RESPIRATION RATE: 16 BRPM | WEIGHT: 194 LBS | HEIGHT: 64 IN

## 2023-08-28 DIAGNOSIS — L73.2 HIDRADENITIS SUPPURATIVA: Primary | ICD-10-CM

## 2023-08-28 PROCEDURE — 99213 OFFICE O/P EST LOW 20 MIN: CPT | Performed by: SURGERY

## 2023-08-28 RX ORDER — TOBRAMYCIN AND DEXAMETHASONE 3; 1 MG/ML; MG/ML
SUSPENSION/ DROPS OPHTHALMIC
COMMUNITY
Start: 2023-06-30

## 2023-08-28 RX ORDER — SODIUM CHLORIDE, SODIUM LACTATE, POTASSIUM CHLORIDE, CALCIUM CHLORIDE 600; 310; 30; 20 MG/100ML; MG/100ML; MG/100ML; MG/100ML
70 INJECTION, SOLUTION INTRAVENOUS CONTINUOUS
OUTPATIENT
Start: 2023-08-28

## 2023-08-28 RX ORDER — SODIUM CHLORIDE 0.9 % (FLUSH) 0.9 %
10 SYRINGE (ML) INJECTION EVERY 12 HOURS SCHEDULED
OUTPATIENT
Start: 2023-08-28

## 2023-08-28 RX ORDER — ONDANSETRON 2 MG/ML
4 INJECTION INTRAMUSCULAR; INTRAVENOUS EVERY 6 HOURS PRN
OUTPATIENT
Start: 2023-08-28

## 2023-08-28 RX ORDER — ADALIMUMAB 40MG/0.4ML
KIT SUBCUTANEOUS
COMMUNITY
Start: 2023-08-14

## 2023-08-28 RX ORDER — MINOCYCLINE HYDROCHLORIDE 100 MG/1
1 CAPSULE ORAL DAILY
COMMUNITY
Start: 2023-06-21

## 2023-08-28 RX ORDER — LEVONORGESTREL AND ETHINYL ESTRADIOL AND ETHINYL ESTRADIOL 150-30(84)
1 KIT ORAL DAILY
COMMUNITY

## 2023-08-28 RX ORDER — SODIUM CHLORIDE 0.9 % (FLUSH) 0.9 %
10 SYRINGE (ML) INJECTION AS NEEDED
OUTPATIENT
Start: 2023-08-28

## 2023-08-28 RX ORDER — FLUTICASONE PROPIONATE 50 MCG
SPRAY, SUSPENSION (ML) NASAL
COMMUNITY

## 2023-08-28 RX ORDER — SULFAMETHOXAZOLE AND TRIMETHOPRIM 800; 160 MG/1; MG/1
TABLET ORAL
COMMUNITY

## 2023-08-28 RX ORDER — SODIUM CHLORIDE 9 MG/ML
40 INJECTION, SOLUTION INTRAVENOUS AS NEEDED
OUTPATIENT
Start: 2023-08-28

## 2023-08-28 RX ORDER — FLUCONAZOLE 10 MG/ML
POWDER, FOR SUSPENSION ORAL
COMMUNITY

## 2023-08-28 RX ORDER — CEPHALEXIN 500 MG/1
CAPSULE ORAL
COMMUNITY

## 2023-08-28 NOTE — H&P (VIEW-ONLY)
Inpatient History and Physical Surgical Orders    Preadmission Location:   Preadmission Time:  Facility:  Surgery Date:  Surgery Time:  Preadmission Test date:     Chief Complaint  Outpatient History and Physical / Surgical Orders    Primary Care Provider: [place] Conn MD    Referring Provider: No ref. provider found    Subjective      Patient Name: Pattie Antoine : 1980    HPI  The patient is a 43-year-old female that we have taken care of quite a bit in the past.  She has had extensive problems with hidradenitis of both axillae and both groins.  She is status post fairly wide excisions in both axillae and both groins.  She has developed a little bit of recurrent disease in the right groin and is having a bit of drainage there now.    Past History:  Medical History: has a past medical history of Allergies, Condition not found, and Hidradenitis.   Surgical History: has a past surgical history that includes Abdominoplasty (2019);  section; Hidradenitis Excision; Incision and Drainage Abscess; and Pilonidal Cystectomy.   Family History: family history includes Diabetes in an other family member; Heart disease in an other family member; Kidney nephrosis in her father and maternal grandmother.   Social History: reports that she has never smoked. She has never used smokeless tobacco. She reports that she does not drink alcohol.  Allergies: Sulfamethoxazole-trimethoprim       Current Outpatient Medications:     Humira Pen 40 MG/0.4ML Pen-injector Kit, , Disp: , Rfl:     minocycline (MINOCIN,DYNACIN) 100 MG capsule, Take 1 capsule by mouth Daily., Disp: , Rfl:     tobramycin-dexAMETHasone (TOBRADEX) 0.3-0.1 % ophthalmic suspension, INSTILL 1 DROP INTO RIGHT EYE 4 TIMES DAILY .SHAKE BOTTLE BEFORE EACH USE, Disp: , Rfl:     Adalimumab (Humira Pen) 40 MG/0.8ML Pen-injector Kit, Humira Pen 40 mg/0.8 mL subcutaneous pen injector kit inject 0.8 milliliter (40 mg) by subcutaneous route every 2 weeks  "  Active, Disp: , Rfl:     Ashlyna 0.15-0.03 &0.01 MG, Take 1 tablet by mouth Daily., Disp: , Rfl:     cephalexin (KEFLEX) 500 MG capsule, , Disp: , Rfl:     fluconazole (DIFLUCAN) 10 MG/ML suspension, , Disp: , Rfl:     fluticasone (FLONASE) 50 MCG/ACT nasal spray, , Disp: , Rfl:     norethindrone-ethinyl estradiol-iron (ESTROSTEP FE) 1-20/1-30/1-35 MG-MCG tablet, , Disp: , Rfl:     sulfamethoxazole-trimethoprim (BACTRIM DS,SEPTRA DS) 800-160 MG per tablet, , Disp: , Rfl:        Objective   Vital Signs:   Resp 16   Ht 162.6 cm (64.02\")   Wt 88 kg (194 lb 0.1 oz)   BMI 33.28 kg/m²       Physical Exam  Vitals and nursing note reviewed.   Constitutional:       Appearance: Normal appearance. The patient is well-developed.   Cardiovascular:      Rate and Rhythm: Normal rate and regular rhythm.   Pulmonary:      Effort: Pulmonary effort is normal.      Breath sounds: Normal air entry.   Abdominal:      General: Bowel sounds are normal.      Palpations: Abdomen is soft.      Skin:     General: Skin is warm and dry.   Neurological:      Mental Status: The patient is alert and oriented to person, place, and time.      Motor: Motor function is intact.   Psychiatric:         Mood and Affect: Mood normal.   Groin: She has a couple of small draining sinuses there in the right groin    Result Review :               Assessment and Plan   Diagnoses and all orders for this visit:    1. Hidradenitis suppurativa (Primary)  -     Case Request; Standing  -     Follow Anesthesia Guidelines / Protocol; Standing  -     Verify NPO Status; Standing  -     Obtain Informed Consent; Standing  -     Verify / Perform Chlorhexidine Skin Prep; Standing  -     Verify / Perform Chlorhexidine Skin Prep if Indicated (If Not Already Completed); Standing  -     Insert Peripheral IV; Standing  -     Saline Lock & Maintain IV Access; Standing  -     sodium chloride 0.9 % flush 10 mL  -     sodium chloride 0.9 % flush 10 mL  -     sodium chloride 0.9 % " infusion 40 mL  -     lactated ringers infusion  -     ondansetron (ZOFRAN) injection 4 mg  -     ceFAZolin (ANCEF) 2,000 mg in sodium chloride 0.9 % 100 mL IVPB  -     Case Request    We will schedule her for an incision and drainage of this right groin recurrent hidradenitis disease.  The recurrence seems to be fairly minimal so hopefully she will have relatively small wounds that we will up pretty quickly.  I have described those procedures to her as well as the risk and benefits and she is agreeable to proceeding.    I  Young Sanchez MD  08/28/2023

## 2023-08-28 NOTE — PROGRESS NOTES
Inpatient History and Physical Surgical Orders    Preadmission Location:   Preadmission Time:  Facility:  Surgery Date:  Surgery Time:  Preadmission Test date:     Chief Complaint  Outpatient History and Physical / Surgical Orders    Primary Care Provider: [place] Conn MD    Referring Provider: No ref. provider found    Subjective      Patient Name: Pattie Antoine : 1980    HPI  The patient is a 43-year-old female that we have taken care of quite a bit in the past.  She has had extensive problems with hidradenitis of both axillae and both groins.  She is status post fairly wide excisions in both axillae and both groins.  She has developed a little bit of recurrent disease in the right groin and is having a bit of drainage there now.    Past History:  Medical History: has a past medical history of Allergies, Condition not found, and Hidradenitis.   Surgical History: has a past surgical history that includes Abdominoplasty (2019);  section; Hidradenitis Excision; Incision and Drainage Abscess; and Pilonidal Cystectomy.   Family History: family history includes Diabetes in an other family member; Heart disease in an other family member; Kidney nephrosis in her father and maternal grandmother.   Social History: reports that she has never smoked. She has never used smokeless tobacco. She reports that she does not drink alcohol.  Allergies: Sulfamethoxazole-trimethoprim       Current Outpatient Medications:     Humira Pen 40 MG/0.4ML Pen-injector Kit, , Disp: , Rfl:     minocycline (MINOCIN,DYNACIN) 100 MG capsule, Take 1 capsule by mouth Daily., Disp: , Rfl:     tobramycin-dexAMETHasone (TOBRADEX) 0.3-0.1 % ophthalmic suspension, INSTILL 1 DROP INTO RIGHT EYE 4 TIMES DAILY .SHAKE BOTTLE BEFORE EACH USE, Disp: , Rfl:     Adalimumab (Humira Pen) 40 MG/0.8ML Pen-injector Kit, Humira Pen 40 mg/0.8 mL subcutaneous pen injector kit inject 0.8 milliliter (40 mg) by subcutaneous route every 2 weeks  "  Active, Disp: , Rfl:     Ashlyna 0.15-0.03 &0.01 MG, Take 1 tablet by mouth Daily., Disp: , Rfl:     cephalexin (KEFLEX) 500 MG capsule, , Disp: , Rfl:     fluconazole (DIFLUCAN) 10 MG/ML suspension, , Disp: , Rfl:     fluticasone (FLONASE) 50 MCG/ACT nasal spray, , Disp: , Rfl:     norethindrone-ethinyl estradiol-iron (ESTROSTEP FE) 1-20/1-30/1-35 MG-MCG tablet, , Disp: , Rfl:     sulfamethoxazole-trimethoprim (BACTRIM DS,SEPTRA DS) 800-160 MG per tablet, , Disp: , Rfl:        Objective   Vital Signs:   Resp 16   Ht 162.6 cm (64.02\")   Wt 88 kg (194 lb 0.1 oz)   BMI 33.28 kg/mý       Physical Exam  Vitals and nursing note reviewed.   Constitutional:       Appearance: Normal appearance. The patient is well-developed.   Cardiovascular:      Rate and Rhythm: Normal rate and regular rhythm.   Pulmonary:      Effort: Pulmonary effort is normal.      Breath sounds: Normal air entry.   Abdominal:      General: Bowel sounds are normal.      Palpations: Abdomen is soft.      Skin:     General: Skin is warm and dry.   Neurological:      Mental Status: The patient is alert and oriented to person, place, and time.      Motor: Motor function is intact.   Psychiatric:         Mood and Affect: Mood normal.   Groin: She has a couple of small draining sinuses there in the right groin    Result Review :               Assessment and Plan   Diagnoses and all orders for this visit:    1. Hidradenitis suppurativa (Primary)  -     Case Request; Standing  -     Follow Anesthesia Guidelines / Protocol; Standing  -     Verify NPO Status; Standing  -     Obtain Informed Consent; Standing  -     Verify / Perform Chlorhexidine Skin Prep; Standing  -     Verify / Perform Chlorhexidine Skin Prep if Indicated (If Not Already Completed); Standing  -     Insert Peripheral IV; Standing  -     Saline Lock & Maintain IV Access; Standing  -     sodium chloride 0.9 % flush 10 mL  -     sodium chloride 0.9 % flush 10 mL  -     sodium chloride 0.9 % " infusion 40 mL  -     lactated ringers infusion  -     ondansetron (ZOFRAN) injection 4 mg  -     ceFAZolin (ANCEF) 2,000 mg in sodium chloride 0.9 % 100 mL IVPB  -     Case Request    We will schedule her for an incision and drainage of this right groin recurrent hidradenitis disease.  The recurrence seems to be fairly minimal so hopefully she will have relatively small wounds that we will up pretty quickly.  I have described those procedures to her as well as the risk and benefits and she is agreeable to proceeding.    I  Young Sanchez MD  08/28/2023     Detail Level: Detailed

## 2023-09-13 NOTE — PRE-PROCEDURE INSTRUCTIONS
PATIENT INSTRUCTED TO BE:    - NPO AFTER MIDNIGHT EXCEPT CAN HAVE CLEAR LIQUIDS 2 HOURS PRIOR TO SURGERY ARRIVAL TIME     - TO HOLD ALL VITAMINS, SUPPLEMENTS, NSAIDS FOR ONE WEEK PRIOR TO THEIR SURGICAL PROCEDURE    - INSTRUCTED PT TO USE SURGICAL SOAP 1 TIME THE NIGHT PRIOR TO SURGERY OR THE AM OF SURGERY.   USE SOAP FROM NECK TO TOES AVOID THEIR FACE, HAIR, AND PRIVATE PARTS. INSTRUCTED NO LOTIONS, JEWELRY, PIERCINGS, OR DEODORANT DAY OF SURGERY    - IF DIABETIC, CHECK BLOOD GLUCOSE IF LESS THAN 70 CALL PREOP AREA -AM OF SURGERY     - INSTRUCTED TO TAKE THE FOLLOWING MEDICATIONS THE DAY OF SURGERY:      ASHYNA, MINOCIN PRN     PATIENT VERBALIZED UNDERSTANDING

## 2023-09-14 ENCOUNTER — HOSPITAL ENCOUNTER (OUTPATIENT)
Facility: HOSPITAL | Age: 43
Setting detail: HOSPITAL OUTPATIENT SURGERY
Discharge: HOME | End: 2023-09-14
Attending: SURGERY | Admitting: SURGERY
Payer: COMMERCIAL

## 2023-09-14 ENCOUNTER — ANESTHESIA (OUTPATIENT)
Dept: PERIOP | Facility: HOSPITAL | Age: 43
End: 2023-09-14
Payer: COMMERCIAL

## 2023-09-14 ENCOUNTER — ANESTHESIA EVENT (OUTPATIENT)
Dept: PERIOP | Facility: HOSPITAL | Age: 43
End: 2023-09-14
Payer: COMMERCIAL

## 2023-09-14 VITALS
SYSTOLIC BLOOD PRESSURE: 130 MMHG | RESPIRATION RATE: 14 BRPM | HEART RATE: 74 BPM | TEMPERATURE: 96.6 F | OXYGEN SATURATION: 96 % | WEIGHT: 199.52 LBS | HEIGHT: 64 IN | DIASTOLIC BLOOD PRESSURE: 81 MMHG | BODY MASS INDEX: 34.06 KG/M2

## 2023-09-14 DIAGNOSIS — L73.2 HIDRADENITIS SUPPURATIVA: ICD-10-CM

## 2023-09-14 DIAGNOSIS — L73.2 HIDRADENITIS: Primary | ICD-10-CM

## 2023-09-14 LAB — B-HCG UR QL: NEGATIVE

## 2023-09-14 PROCEDURE — 25010000002 FENTANYL CITRATE (PF) 50 MCG/ML SOLUTION: Performed by: NURSE ANESTHETIST, CERTIFIED REGISTERED

## 2023-09-14 PROCEDURE — 25010000002 MIDAZOLAM PER 1MG: Performed by: ANESTHESIOLOGY

## 2023-09-14 PROCEDURE — 25010000002 CEFAZOLIN IN DEXTROSE 2000 MG/ 100 ML SOLUTION: Performed by: SURGERY

## 2023-09-14 PROCEDURE — 11450 EXC SKN HDRDNT AX SMPL/NTRM: CPT | Performed by: SURGERY

## 2023-09-14 PROCEDURE — 25010000002 HYDROMORPHONE 1 MG/ML SOLUTION: Performed by: NURSE ANESTHETIST, CERTIFIED REGISTERED

## 2023-09-14 PROCEDURE — 81025 URINE PREGNANCY TEST: CPT | Performed by: ANESTHESIOLOGY

## 2023-09-14 PROCEDURE — 25010000002 PROPOFOL 10 MG/ML EMULSION: Performed by: NURSE ANESTHETIST, CERTIFIED REGISTERED

## 2023-09-14 PROCEDURE — 25010000002 ONDANSETRON PER 1 MG: Performed by: NURSE ANESTHETIST, CERTIFIED REGISTERED

## 2023-09-14 PROCEDURE — 11462 EXC SKN HDRDNT ING SMPL/NTRM: CPT | Performed by: SURGERY

## 2023-09-14 PROCEDURE — 25010000002 DEXAMETHASONE PER 1 MG: Performed by: NURSE ANESTHETIST, CERTIFIED REGISTERED

## 2023-09-14 PROCEDURE — 88304 TISSUE EXAM BY PATHOLOGIST: CPT | Performed by: SURGERY

## 2023-09-14 PROCEDURE — 25010000002 KETOROLAC TROMETHAMINE PER 15 MG: Performed by: NURSE ANESTHETIST, CERTIFIED REGISTERED

## 2023-09-14 RX ORDER — SODIUM CHLORIDE 9 MG/ML
40 INJECTION, SOLUTION INTRAVENOUS AS NEEDED
Status: DISCONTINUED | OUTPATIENT
Start: 2023-09-14 | End: 2023-09-14 | Stop reason: HOSPADM

## 2023-09-14 RX ORDER — DEXAMETHASONE SODIUM PHOSPHATE 4 MG/ML
INJECTION, SOLUTION INTRA-ARTICULAR; INTRALESIONAL; INTRAMUSCULAR; INTRAVENOUS; SOFT TISSUE AS NEEDED
Status: DISCONTINUED | OUTPATIENT
Start: 2023-09-14 | End: 2023-09-14 | Stop reason: SURG

## 2023-09-14 RX ORDER — HYDROCODONE BITARTRATE AND ACETAMINOPHEN 5; 325 MG/1; MG/1
1 TABLET ORAL EVERY 6 HOURS PRN
Qty: 18 TABLET | Refills: 0 | Status: SHIPPED | OUTPATIENT
Start: 2023-09-14

## 2023-09-14 RX ORDER — FENTANYL CITRATE 50 UG/ML
INJECTION, SOLUTION INTRAMUSCULAR; INTRAVENOUS AS NEEDED
Status: DISCONTINUED | OUTPATIENT
Start: 2023-09-14 | End: 2023-09-14 | Stop reason: SURG

## 2023-09-14 RX ORDER — MIDAZOLAM HYDROCHLORIDE 2 MG/2ML
2 INJECTION, SOLUTION INTRAMUSCULAR; INTRAVENOUS ONCE
Status: COMPLETED | OUTPATIENT
Start: 2023-09-14 | End: 2023-09-14

## 2023-09-14 RX ORDER — ONDANSETRON 2 MG/ML
INJECTION INTRAMUSCULAR; INTRAVENOUS AS NEEDED
Status: DISCONTINUED | OUTPATIENT
Start: 2023-09-14 | End: 2023-09-14 | Stop reason: SURG

## 2023-09-14 RX ORDER — LIDOCAINE HYDROCHLORIDE 20 MG/ML
INJECTION, SOLUTION EPIDURAL; INFILTRATION; INTRACAUDAL; PERINEURAL AS NEEDED
Status: DISCONTINUED | OUTPATIENT
Start: 2023-09-14 | End: 2023-09-14 | Stop reason: SURG

## 2023-09-14 RX ORDER — PROPOFOL 10 MG/ML
VIAL (ML) INTRAVENOUS AS NEEDED
Status: DISCONTINUED | OUTPATIENT
Start: 2023-09-14 | End: 2023-09-14 | Stop reason: SURG

## 2023-09-14 RX ORDER — KETOROLAC TROMETHAMINE 30 MG/ML
INJECTION, SOLUTION INTRAMUSCULAR; INTRAVENOUS AS NEEDED
Status: DISCONTINUED | OUTPATIENT
Start: 2023-09-14 | End: 2023-09-14 | Stop reason: SURG

## 2023-09-14 RX ORDER — SODIUM CHLORIDE 0.9 % (FLUSH) 0.9 %
10 SYRINGE (ML) INJECTION EVERY 12 HOURS SCHEDULED
Status: DISCONTINUED | OUTPATIENT
Start: 2023-09-14 | End: 2023-09-14 | Stop reason: HOSPADM

## 2023-09-14 RX ORDER — ONDANSETRON 4 MG/1
4 TABLET, FILM COATED ORAL ONCE AS NEEDED
Status: DISCONTINUED | OUTPATIENT
Start: 2023-09-14 | End: 2023-09-14 | Stop reason: HOSPADM

## 2023-09-14 RX ORDER — PROMETHAZINE HYDROCHLORIDE 12.5 MG/1
25 TABLET ORAL ONCE AS NEEDED
Status: DISCONTINUED | OUTPATIENT
Start: 2023-09-14 | End: 2023-09-14 | Stop reason: HOSPADM

## 2023-09-14 RX ORDER — OXYCODONE HYDROCHLORIDE 5 MG/1
5 TABLET ORAL
Status: DISCONTINUED | OUTPATIENT
Start: 2023-09-14 | End: 2023-09-14 | Stop reason: HOSPADM

## 2023-09-14 RX ORDER — HYDROCODONE BITARTRATE AND ACETAMINOPHEN 5; 325 MG/1; MG/1
1 TABLET ORAL ONCE AS NEEDED
Status: DISCONTINUED | OUTPATIENT
Start: 2023-09-14 | End: 2023-09-14 | Stop reason: HOSPADM

## 2023-09-14 RX ORDER — SODIUM CHLORIDE, SODIUM LACTATE, POTASSIUM CHLORIDE, CALCIUM CHLORIDE 600; 310; 30; 20 MG/100ML; MG/100ML; MG/100ML; MG/100ML
9 INJECTION, SOLUTION INTRAVENOUS CONTINUOUS PRN
Status: DISCONTINUED | OUTPATIENT
Start: 2023-09-14 | End: 2023-09-14 | Stop reason: HOSPADM

## 2023-09-14 RX ORDER — SODIUM CHLORIDE, SODIUM LACTATE, POTASSIUM CHLORIDE, CALCIUM CHLORIDE 600; 310; 30; 20 MG/100ML; MG/100ML; MG/100ML; MG/100ML
70 INJECTION, SOLUTION INTRAVENOUS CONTINUOUS
Status: DISCONTINUED | OUTPATIENT
Start: 2023-09-14 | End: 2023-09-14 | Stop reason: HOSPADM

## 2023-09-14 RX ORDER — MEPERIDINE HYDROCHLORIDE 25 MG/ML
12.5 INJECTION INTRAMUSCULAR; INTRAVENOUS; SUBCUTANEOUS
Status: DISCONTINUED | OUTPATIENT
Start: 2023-09-14 | End: 2023-09-14 | Stop reason: HOSPADM

## 2023-09-14 RX ORDER — PROMETHAZINE HYDROCHLORIDE 25 MG/1
25 SUPPOSITORY RECTAL ONCE AS NEEDED
Status: DISCONTINUED | OUTPATIENT
Start: 2023-09-14 | End: 2023-09-14 | Stop reason: HOSPADM

## 2023-09-14 RX ORDER — SCOLOPAMINE TRANSDERMAL SYSTEM 1 MG/1
1 PATCH, EXTENDED RELEASE TRANSDERMAL ONCE
Status: DISCONTINUED | OUTPATIENT
Start: 2023-09-14 | End: 2023-09-14 | Stop reason: HOSPADM

## 2023-09-14 RX ORDER — ONDANSETRON 2 MG/ML
4 INJECTION INTRAMUSCULAR; INTRAVENOUS ONCE AS NEEDED
Status: DISCONTINUED | OUTPATIENT
Start: 2023-09-14 | End: 2023-09-14 | Stop reason: HOSPADM

## 2023-09-14 RX ORDER — IBUPROFEN 600 MG/1
600 TABLET ORAL EVERY 6 HOURS PRN
Status: DISCONTINUED | OUTPATIENT
Start: 2023-09-14 | End: 2023-09-14 | Stop reason: HOSPADM

## 2023-09-14 RX ORDER — ONDANSETRON 2 MG/ML
4 INJECTION INTRAMUSCULAR; INTRAVENOUS EVERY 6 HOURS PRN
Status: DISCONTINUED | OUTPATIENT
Start: 2023-09-14 | End: 2023-09-14 | Stop reason: HOSPADM

## 2023-09-14 RX ORDER — SODIUM CHLORIDE 0.9 % (FLUSH) 0.9 %
10 SYRINGE (ML) INJECTION AS NEEDED
Status: DISCONTINUED | OUTPATIENT
Start: 2023-09-14 | End: 2023-09-14 | Stop reason: HOSPADM

## 2023-09-14 RX ORDER — ACETAMINOPHEN 500 MG
1000 TABLET ORAL ONCE
Status: COMPLETED | OUTPATIENT
Start: 2023-09-14 | End: 2023-09-14

## 2023-09-14 RX ORDER — CEFAZOLIN SODIUM 2 G/100ML
2000 INJECTION, SOLUTION INTRAVENOUS ONCE
Status: COMPLETED | OUTPATIENT
Start: 2023-09-14 | End: 2023-09-14

## 2023-09-14 RX ADMIN — MIDAZOLAM HYDROCHLORIDE 2 MG: 1 INJECTION, SOLUTION INTRAMUSCULAR; INTRAVENOUS at 10:38

## 2023-09-14 RX ADMIN — KETOROLAC TROMETHAMINE 30 MG: 30 INJECTION, SOLUTION INTRAMUSCULAR; INTRAVENOUS at 11:33

## 2023-09-14 RX ADMIN — FENTANYL CITRATE 25 MCG: 50 INJECTION, SOLUTION INTRAMUSCULAR; INTRAVENOUS at 10:45

## 2023-09-14 RX ADMIN — SCOPOLAMINE 1 PATCH: 1.5 PATCH, EXTENDED RELEASE TRANSDERMAL at 09:56

## 2023-09-14 RX ADMIN — SODIUM CHLORIDE, POTASSIUM CHLORIDE, SODIUM LACTATE AND CALCIUM CHLORIDE 9 ML/HR: 600; 310; 30; 20 INJECTION, SOLUTION INTRAVENOUS at 09:56

## 2023-09-14 RX ADMIN — FENTANYL CITRATE 25 MCG: 50 INJECTION, SOLUTION INTRAMUSCULAR; INTRAVENOUS at 10:54

## 2023-09-14 RX ADMIN — LIDOCAINE HYDROCHLORIDE 50 MG: 20 INJECTION, SOLUTION EPIDURAL; INFILTRATION; INTRACAUDAL; PERINEURAL at 10:45

## 2023-09-14 RX ADMIN — DEXAMETHASONE SODIUM PHOSPHATE 4 MG: 4 INJECTION, SOLUTION INTRAMUSCULAR; INTRAVENOUS at 10:52

## 2023-09-14 RX ADMIN — PROPOFOL 200 MG: 10 INJECTION, EMULSION INTRAVENOUS at 10:45

## 2023-09-14 RX ADMIN — ONDANSETRON 4 MG: 2 INJECTION INTRAMUSCULAR; INTRAVENOUS at 11:33

## 2023-09-14 RX ADMIN — OXYCODONE HYDROCHLORIDE 5 MG: 5 TABLET ORAL at 12:33

## 2023-09-14 RX ADMIN — HYDROMORPHONE HYDROCHLORIDE 0.5 MG: 1 INJECTION, SOLUTION INTRAMUSCULAR; INTRAVENOUS; SUBCUTANEOUS at 11:56

## 2023-09-14 RX ADMIN — ACETAMINOPHEN 1000 MG: 500 TABLET ORAL at 09:56

## 2023-09-14 RX ADMIN — FENTANYL CITRATE 25 MCG: 50 INJECTION, SOLUTION INTRAMUSCULAR; INTRAVENOUS at 11:07

## 2023-09-14 RX ADMIN — CEFAZOLIN SODIUM 2000 MG: 2 INJECTION, SOLUTION INTRAVENOUS at 10:51

## 2023-09-14 RX ADMIN — HYDROMORPHONE HYDROCHLORIDE 0.5 MG: 1 INJECTION, SOLUTION INTRAMUSCULAR; INTRAVENOUS; SUBCUTANEOUS at 12:06

## 2023-09-14 RX ADMIN — FENTANYL CITRATE 25 MCG: 50 INJECTION, SOLUTION INTRAMUSCULAR; INTRAVENOUS at 09:50

## 2023-09-14 NOTE — OP NOTE
INCISION AND DRAINAGE WOUND  Procedure Report    Patient Name:  Pattie Antoine  YOB: 1980    Date of Surgery:  9/14/2023     Indications: The patient is a 43-year-old female who presented with longstanding problems with hidradenitis suppurativa of both groins and both axillae.  She presented to the office complaining of significant pain and drainage in the right groin as well as the left axilla.  The decision was made to proceed with excision of this hidradenitis disease and the right groin and left axilla.    Pre-op Diagnosis: Hidradenitis suppurativa of the right groin and left axilla    Post-Op Diagnosis: Same    Procedure/CPT® Codes:    Excision of extensive hidradenitis suppurativa of the right groin and left axilla    Staff:  Surgeon(s):  Young Sanchez MD         Anesthesia: General    Estimated Blood Loss: 10 mL    Implants:    Nothing was implanted during the procedure    Specimen:          Specimens       ID Source Type Tests Collected By Collected At Frozen?    A Groin, right Tissue TISSUE PATHOLOGY EXAM   Young Sanchez MD 9/14/23 1104     Description: right groin hydraadenitis    B Axilla, Left Tissue TISSUE PATHOLOGY EXAM   Young Sanchez MD 9/14/23 1126     Description: LEFT AXILLA HYDRAADENITIS                Findings: Extensive hidradenitis noted in the right groin with subcutaneous tracking noted.  Extensive hidradenitis noted in the left axilla with a sizable left axillary subcutaneous abscess noted.    Complications: None    Description of Procedure: The patient was taken the operating room and placed on the table in supine position.  After induction of general anesthesia, she was initially placed in lithotomy position and her right groin was prepped and draped sterilely.  She had an area of significant induration and swelling just lateral to the right labia.  When I pressed on this area there were several sinuses in this area that drained purulent fluid.  I made an  elliptical incision around this area with a 15 blade scalpel and dissected down into the subcutaneous tissues and identified obvious hidradenitis tracks that were going cephalad and the right groin crease.  I extended my skin incision and unroofed all of this hidradenitis disease using a 15 blade scalpel.  I then excised all the visible hidradenitis disease and this included skin and subcutaneous tissue but no muscle.  Once all of the hidradenitis tissue was removed we ended up with a wound in the right groin that was about 3 cm x 9 cm.  After achieving adequate hemostasis I then irrigated out the wound with sterile saline and then packed the wound open with iodoform packing strip.  Sterile dressings were applied.  She was then placed back in supine position and we then prepped and draped her left axilla.  She had an area of significant swelling and induration there in the left axilla.  I made an elliptical incision around this and dissected down into the subcutaneous tissues and identified obvious hidradenitis disease in the subcutaneous fat.  We began to excise this and entered into a sizable abscess cavity and drained out a fair amount of pus.  We went ahead and continue to unroof all of the visible hidradenitis disease and then excised that all with cautery.  We ended up with a wound that was about 4 x 6 cm in dimension.  Again we excised chronically infected skin and subcutaneous fat during this left axillary excision.  Hemostasis was achieved with cautery and the wound was irrigated out with saline.  I then packed the wound open with iodophor packing strip and sterile dressings were applied.       was responsible for performing the following activities: Retraction, Suction, Irrigation, and Placing Dressing and their skilled assistance was necessary for the success of this case.    Young Sanchez MD     Date: 9/14/2023  Time: 11:36 EDT

## 2023-09-14 NOTE — ANESTHESIA POSTPROCEDURE EVALUATION
Patient: Pattie Antoine    Procedure Summary       Date: 09/14/23 Room / Location: [facility] OR  / [facility]    Anesthesia Start: 1041 Anesthesia Stop: 1142    Procedure: EXCISION OF HYDRAADENITIS RIGHT GROIN AND LEFT AXILLA (Bilateral) Diagnosis:       Hidradenitis suppurativa      (Hidradenitis suppurativa [L73.2])    Surgeons: Young Sanchez MD Provider: Javier Ash MD    Anesthesia Type: general ASA Status: 1            Anesthesia Type: general    Vitals  Vitals Value Taken Time   /102 09/14/23 1227   Temp     Pulse 80 09/14/23 1231   Resp     SpO2 98 % 09/14/23 1231   Vitals shown include unvalidated device data.        Post Anesthesia Care and Evaluation    Patient location during evaluation: bedside  Patient participation: complete - patient participated  Level of consciousness: awake  Pain management: adequate    Airway patency: patent  PONV Status: none  Cardiovascular status: acceptable  Respiratory status: acceptable  Hydration status: acceptable    Comments: An Anesthesiologist personally participated in the most demanding procedures (including induction and emergence if applicable) in the anesthesia plan, monitored the course of anesthesia administration at frequent intervals and remained physically present and available for immediate diagnosis and treatment of emergencies.

## 2023-09-14 NOTE — DISCHARGE INSTRUCTIONS
DISCHARGE INSTRUCTIONS  SURGICAL / AMBULATORY  PROCEDURES      For your surgery you had:  General anesthesia (you may have a sore throat for the first 24 hours)  IV sedation.  Local anesthesia  Monitored anesthesia Care  You received a medicated patch for nausea prevention today (behind your ear). It is recommended that you remove it 24-48 hours post-operatively. It must be removed within 72 hours.   You have received an anesthesia medication today that can cause hormonal forms of birth control to be ineffective. You should use a different form of birth control (to prevent pregnancy) for 7 days.   You may experience dizziness, drowsiness, or light-headedness for several hours following surgery/procedure.  Do not stay alone today or tonight.  Limit your activity for 24 hours.  Resume your diet slowly.  Follow whatever special dietary instructions you may have been given by your doctor.  You should not drive or operate machinery, drink alcohol, or sign legally binding documents for 24 hours or while you are taking pain medication.  Last dose of pain medication was given at:   .  NOTIFY YOUR DOCTOR IF YOU EXPERIENCE ANY OF THE FOLLOWING:  Temperature greater than 101 degrees Fahrenheit  Shaking Chills  Redness or excessive drainage from incision  Nausea, vomiting and/or pain that is not controlled by prescribed medications  Increase in bleeding or bleeding that is excessive  Unable to urinate in 6 hours after surgery  If unable to reach your doctor, please go to the closest Emergency Room  You may begin dressing changes:     [] in 24 hours   [] in 48 hours   [] Other:    You may remove Band-Aid/dressing tomorrow.  You may shower or bathe   .  Apply an ice pack 24-48 hours.  Medications per physician instructions as indicated on Discharge Medication Information Sheet.  You should see   for follow-up care   on   .  Phone number:       SPECIAL INSTRUCTIONS:  WOUND CARE IN BASEMENT  AT 9:00  ARRIVE

## 2023-09-14 NOTE — ANESTHESIA PREPROCEDURE EVALUATION
Anesthesia Evaluation     Patient summary reviewed and Nursing notes reviewed                Airway   Mallampati: I  TM distance: >3 FB  Neck ROM: full  No difficulty expected  Dental      Pulmonary - negative pulmonary ROS and normal exam    breath sounds clear to auscultation  Cardiovascular - negative cardio ROS and normal exam    Rhythm: regular  Rate: normal        Neuro/Psych- negative ROS  GI/Hepatic/Renal/Endo    (+) obesity    Musculoskeletal (-) negative ROS    Abdominal    Substance History - negative use     OB/GYN negative ob/gyn ROS         Other                      Anesthesia Plan    ASA 1     general     intravenous induction     Anesthetic plan, risks, benefits, and alternatives have been provided, discussed and informed consent has been obtained with: patient.    CODE STATUS:

## 2023-09-15 ENCOUNTER — HOSPITAL ENCOUNTER (OUTPATIENT)
Dept: INFUSION THERAPY | Facility: HOSPITAL | Age: 43
Discharge: HOME | End: 2023-09-15
Payer: COMMERCIAL

## 2023-09-15 VITALS
SYSTOLIC BLOOD PRESSURE: 142 MMHG | DIASTOLIC BLOOD PRESSURE: 72 MMHG | RESPIRATION RATE: 18 BRPM | OXYGEN SATURATION: 99 % | HEART RATE: 58 BPM | TEMPERATURE: 98.4 F

## 2023-09-15 DIAGNOSIS — L73.2 HIDRADENITIS SUPPURATIVA: Primary | ICD-10-CM

## 2023-09-15 LAB
CYTO UR: NORMAL
LAB AP CASE REPORT: NORMAL
LAB AP CLINICAL INFORMATION: NORMAL
PATH REPORT.FINAL DX SPEC: NORMAL
PATH REPORT.GROSS SPEC: NORMAL

## 2023-09-15 PROCEDURE — G0463 HOSPITAL OUTPT CLINIC VISIT: HCPCS

## 2023-09-15 NOTE — SIGNIFICANT NOTE
Wound Eval / Progress Noted    [facility]     Patient Name: Pattie Antoine  : 1980  MRN: [id number]  Today's Date: 9/15/2023                 Admit Date: 9/15/2023    Visit Dx:    ICD-10-CM ICD-9-CM   1. Hidradenitis suppurativa  L73.2 705.83         * No active hospital problems. *        Past Medical History:   Diagnosis Date    Allergies     Condition not found     Mass    Hidradenitis     Right groin wound         Past Surgical History:   Procedure Laterality Date    ABDOMINOPLASTY  2019    With flank liposuction     SECTION      HIDRADENITIS EXCISION      Drainage    INCISION AND DRAINAGE ABSCESS      INCISION AND DRAINAGE OF WOUND Bilateral 2023    Procedure: EXCISION OF HYDRAADENITIS RIGHT GROIN AND LEFT AXILLA;  Surgeon: Young Sanchez MD;  Location: [facility] OR [facility] [place];  Service: General;  Laterality: Bilateral;    PILONIDAL CYSTECTOMY           Physical Assessment:  Wound 23 Right anterior groin Incision (Active)   Wound Image   09/15/23 1013   Dressing Appearance moist drainage;intact 09/15/23 1013   Closure None 09/15/23 1013   Base moist;red;pink;subcutaneous;bleeding 09/15/23 1013   Periwound dry;intact 09/15/23 1013   Periwound Temperature warm 09/15/23 1013   Periwound Skin Turgor soft 09/15/23 1013   Edges open 09/15/23 1013   Drainage Characteristics/Odor serosanguineous;bleeding controlled 09/15/23 1013   Drainage Amount small 09/15/23 1013   Care, Wound cleansed with;irrigated with;sterile normal saline 09/15/23 1013   Dressing Care dressing applied;collagen;packed with;packing strip;gauze, iodoform;abdominal pad;mesh panty 09/15/23 1013   Periwound Care absorptive dressing applied 09/15/23 1013       Wound 23 Left axilla Incision (Active)   Wound Image   09/15/23 1013   Dressing Appearance dry;intact 09/15/23 1013   Closure None 09/15/23 1013   Base moist;red;pink;subcutaneous 09/15/23 1013   Periwound dry;intact 09/15/23 1013   Periwound Temperature warm  09/15/23 1013   Periwound Skin Turgor soft 09/15/23 1013   Edges open 09/15/23 1013   Wound Length (cm) 3.7 cm 09/15/23 1013   Wound Width (cm) 4.6 cm 09/15/23 1013   Wound Depth (cm) 1.2 cm 09/15/23 1013   Wound Surface Area (cm^2) 17.02 cm^2 09/15/23 1013   Wound Volume (cm^3) 20.424 cm^3 09/15/23 1013   Drainage Characteristics/Odor serosanguineous 09/15/23 1013   Drainage Amount small 09/15/23 1013   Care, Wound cleansed with;irrigated with;sterile normal saline 09/15/23 1013   Dressing Care dressing applied;packed with;packing strip;gauze, iodoform;silicone;border dressing 09/15/23 1013   Periwound Care absorptive dressing applied 09/15/23 1013      Wound Check / Follow-up:  Patient seen today for initial evaluation and treatment in outpatient nursing services. Patient status post surgical excision of hidradenitis to left axilla and right groin yesterday 9/14/23. Dressings are intact at this time. Removed surgical dressings. Left axilla wound base with red and pink moist tissue as well as subcutaneous tissue. Cleansed and irrigated with normal saline. Filled wound with iodoform packing strip and secured with silicone border dressing. Right groin incision with red and pink moist wound base as well as subcutaneous tissue also noted. Bleeding noted from wound. Pressure held then collagen (Anita) applied to help stop bleeding. Bleeding resolved before packing with iodoform packing strip gauze. Covered with ABD pad then mesh undergarment used to keep in place. Discussed treatment plan options with patient. Patient choosing to come daily for dressing changes at this time but states she will have her  come with her so he can determine if he will be able to pack these wounds for her daily at home. Card given with scheduling phone number and instructed patient to call to make appointments. She verbalized understanding.      Impression: Surgical incisions.      Short term goals: Regain skin integrity, daily  dressing changes.       Holly Spencer RN    9/15/2023    10:47 EDT

## 2023-09-16 ENCOUNTER — HOSPITAL ENCOUNTER (OUTPATIENT)
Dept: INFUSION THERAPY | Facility: HOSPITAL | Age: 43
Discharge: HOME | End: 2023-09-16
Admitting: NURSE PRACTITIONER
Payer: COMMERCIAL

## 2023-09-16 VITALS
RESPIRATION RATE: 16 BRPM | SYSTOLIC BLOOD PRESSURE: 129 MMHG | HEART RATE: 60 BPM | DIASTOLIC BLOOD PRESSURE: 82 MMHG | TEMPERATURE: 97.7 F

## 2023-09-16 DIAGNOSIS — L73.2 HIDRADENITIS SUPPURATIVA: Primary | ICD-10-CM

## 2023-09-16 PROCEDURE — G0463 HOSPITAL OUTPT CLINIC VISIT: HCPCS

## 2023-09-17 ENCOUNTER — HOSPITAL ENCOUNTER (OUTPATIENT)
Dept: INFUSION THERAPY | Facility: HOSPITAL | Age: 43
Discharge: HOME | End: 2023-09-17
Admitting: NURSE PRACTITIONER
Payer: COMMERCIAL

## 2023-09-17 VITALS
RESPIRATION RATE: 16 BRPM | OXYGEN SATURATION: 100 % | HEART RATE: 64 BPM | SYSTOLIC BLOOD PRESSURE: 148 MMHG | DIASTOLIC BLOOD PRESSURE: 87 MMHG | TEMPERATURE: 98.1 F

## 2023-09-17 PROCEDURE — G0463 HOSPITAL OUTPT CLINIC VISIT: HCPCS

## 2023-09-18 ENCOUNTER — HOSPITAL ENCOUNTER (OUTPATIENT)
Dept: INFUSION THERAPY | Facility: HOSPITAL | Age: 43
Discharge: HOME | End: 2023-09-18
Admitting: NURSE PRACTITIONER
Payer: COMMERCIAL

## 2023-09-18 VITALS
HEART RATE: 72 BPM | TEMPERATURE: 98 F | DIASTOLIC BLOOD PRESSURE: 88 MMHG | SYSTOLIC BLOOD PRESSURE: 136 MMHG | RESPIRATION RATE: 16 BRPM | OXYGEN SATURATION: 100 %

## 2023-09-18 DIAGNOSIS — L73.2 HIDRADENITIS SUPPURATIVA: Primary | ICD-10-CM

## 2023-09-18 PROCEDURE — G0463 HOSPITAL OUTPT CLINIC VISIT: HCPCS

## 2023-09-18 NOTE — SIGNIFICANT NOTE
Wound Eval / Progress Noted    [facility]     Patient Name: Pattie Antoine  : 1980  MRN: [id number]  Today's Date: 2023                 Admit Date: 2023    Visit Dx:    ICD-10-CM ICD-9-CM   1. Hidradenitis suppurativa  L73.2 705.83         * No active hospital problems. *        Past Medical History:   Diagnosis Date    Allergies     Condition not found     Mass    Hidradenitis     Right groin wound         Past Surgical History:   Procedure Laterality Date    ABDOMINOPLASTY  2019    With flank liposuction     SECTION      HIDRADENITIS EXCISION      Drainage    INCISION AND DRAINAGE ABSCESS      INCISION AND DRAINAGE OF WOUND Bilateral 2023    Procedure: EXCISION OF HYDRAADENITIS RIGHT GROIN AND LEFT AXILLA;  Surgeon: Young Sanchez MD;  [place] [facility] [place] [facility] [place];  Service: General;  Laterality: Bilateral;    PILONIDAL CYSTECTOMY           Physical Assessment:  Wound 23 Right anterior groin Incision (Active)   Wound Image   23 1010   Dressing Appearance moist drainage 23 1010   Closure None 23 1010   Base red;moist;yellow 23 1010   Periwound intact 23 1010   Periwound Temperature warm 23 1010   Periwound Skin Turgor soft 23 1010   Edges open 23 1010   Wound Length (cm) 10 cm 23 1010   Wound Width (cm) 2.2 cm 23 1010   Wound Depth (cm) 1 cm 23 1010   Wound Surface Area (cm^2) 22 cm^2 23 1010   Wound Volume (cm^3) 22 cm^3 23 1010   Drainage Characteristics/Odor serosanguineous;serous 23 1010   Drainage Amount small 23 1010   Care, Wound cleansed with;irrigated with;sterile normal saline 23 1010   Dressing Care dressing applied;packing strip;packed with;gauze, iodoform;border dressing;silicone;abdominal pad 23 1010   Periwound Care absorptive dressing applied 23 1010       Wound 23 Left axilla Incision (Active)   Wound Image   23 1010   Closure None  09/18/23 1010   Base red;moist;yellow 09/18/23 1010   Periwound intact 09/18/23 1010   Periwound Temperature warm 09/18/23 1010   Periwound Skin Turgor soft 09/18/23 1010   Edges open 09/18/23 1010   Wound Length (cm) 5.2 cm 09/18/23 1010   Wound Width (cm) 4 cm 09/18/23 1010   Wound Depth (cm) 0.7 cm 09/18/23 1010   Wound Surface Area (cm^2) 20.8 cm^2 09/18/23 1010   Wound Volume (cm^3) 14.56 cm^3 09/18/23 1010   Drainage Characteristics/Odor serosanguineous;serous 09/18/23 1010   Drainage Amount small 09/18/23 1010   Care, Wound cleansed with;irrigated with;sterile normal saline 09/18/23 1010   Dressing Care dressing applied;packed with;packing strip;gauze, iodoform;border dressing;silicone 09/18/23 1010   Periwound Care absorptive dressing applied 09/18/23 1010        Wound Check / Follow-up:  Patient seen today for wound check and dressing change. Patient states that she removed packing for showering and then placed top covering until appointment  She has an open wound to left axilla that is red and moist but also with slight yellow coating. Cleansed with NS and gauze. Blotted dry. Wound filled with iodoform packing strip and secured with large silicone border dressing.   Patient also with open wound to right groin. More depth noted to base of wound at 6 o'clock than any other portion of wound. Wound base is also red and moist with slight yellow coating. Cleansed and irrigated with NS and gauze. Blotted dry. Wound filled with iodoform packing strip and then secured with partial silicone border dressing to keep packing protected and in place and then additional ABD pad applied within mesh undergarments.   Patient to continue daily dressing changes but may consider changing to a foam dressing to permit less frequent visits or spouse may elect to begin doing dressing changes.     There was some raised tissue noted to left axilla area above wound, soft, non-tender and without nodules. Likely scarring from prior  surgeries.  There is also some firmness to mons pubis. Area without tenderness or pain. No active drainage noted. Patient states these areas pop up and then open and drain and go away.         Impression: Surgical incisions to left axilla and right groin with closure by secondary intent    Short term goals:  Regain skin integrity. Dressing changes daily.    Keshia Ssoa RN    9/18/2023    17:23 EDT

## 2023-09-19 ENCOUNTER — HOSPITAL ENCOUNTER (OUTPATIENT)
Dept: INFUSION THERAPY | Facility: HOSPITAL | Age: 43
Discharge: HOME | End: 2023-09-19
Admitting: NURSE PRACTITIONER
Payer: COMMERCIAL

## 2023-09-19 VITALS
SYSTOLIC BLOOD PRESSURE: 141 MMHG | RESPIRATION RATE: 18 BRPM | TEMPERATURE: 98.4 F | HEART RATE: 77 BPM | OXYGEN SATURATION: 99 % | DIASTOLIC BLOOD PRESSURE: 89 MMHG

## 2023-09-19 DIAGNOSIS — L73.2 HIDRADENITIS SUPPURATIVA: Primary | ICD-10-CM

## 2023-09-19 PROCEDURE — G0463 HOSPITAL OUTPT CLINIC VISIT: HCPCS

## 2023-09-20 ENCOUNTER — HOSPITAL ENCOUNTER (OUTPATIENT)
Dept: INFUSION THERAPY | Facility: HOSPITAL | Age: 43
Discharge: HOME | End: 2023-09-20
Admitting: NURSE PRACTITIONER
Payer: COMMERCIAL

## 2023-09-20 VITALS
DIASTOLIC BLOOD PRESSURE: 79 MMHG | OXYGEN SATURATION: 100 % | HEART RATE: 72 BPM | TEMPERATURE: 98.5 F | RESPIRATION RATE: 18 BRPM | SYSTOLIC BLOOD PRESSURE: 133 MMHG

## 2023-09-20 DIAGNOSIS — L73.2 HIDRADENITIS SUPPURATIVA: Primary | ICD-10-CM

## 2023-09-20 PROCEDURE — G0463 HOSPITAL OUTPT CLINIC VISIT: HCPCS

## 2023-09-21 ENCOUNTER — HOSPITAL ENCOUNTER (OUTPATIENT)
Dept: INFUSION THERAPY | Facility: HOSPITAL | Age: 43
Discharge: HOME | End: 2023-09-21
Admitting: NURSE PRACTITIONER
Payer: COMMERCIAL

## 2023-09-21 VITALS
DIASTOLIC BLOOD PRESSURE: 85 MMHG | TEMPERATURE: 98.2 F | HEART RATE: 72 BPM | SYSTOLIC BLOOD PRESSURE: 135 MMHG | RESPIRATION RATE: 18 BRPM | OXYGEN SATURATION: 99 %

## 2023-09-21 DIAGNOSIS — L73.2 HIDRADENITIS SUPPURATIVA: Primary | ICD-10-CM

## 2023-09-21 PROCEDURE — G0463 HOSPITAL OUTPT CLINIC VISIT: HCPCS

## 2023-09-22 ENCOUNTER — HOSPITAL ENCOUNTER (OUTPATIENT)
Dept: INFUSION THERAPY | Facility: HOSPITAL | Age: 43
Discharge: HOME | End: 2023-09-22
Payer: COMMERCIAL

## 2023-09-22 VITALS
TEMPERATURE: 98.4 F | RESPIRATION RATE: 20 BRPM | OXYGEN SATURATION: 99 % | SYSTOLIC BLOOD PRESSURE: 146 MMHG | DIASTOLIC BLOOD PRESSURE: 93 MMHG | HEART RATE: 72 BPM

## 2023-09-22 DIAGNOSIS — L73.2 HIDRADENITIS SUPPURATIVA: Primary | ICD-10-CM

## 2023-09-22 PROCEDURE — G0463 HOSPITAL OUTPT CLINIC VISIT: HCPCS

## 2023-09-22 NOTE — SIGNIFICANT NOTE
Wound Eval / Progress Noted    [facility]     Patient Name: Pattie Antoine  : 1980  MRN: [id number]  Today's Date: 2023                 Admit Date: 2023    Visit Dx:    ICD-10-CM ICD-9-CM   1. Hidradenitis suppurativa  L73.2 705.83         * No active hospital problems. *        Past Medical History:   Diagnosis Date    Allergies     Condition not found     Mass    Hidradenitis     Right groin wound         Past Surgical History:   Procedure Laterality Date    ABDOMINOPLASTY  2019    With flank liposuction     SECTION      HIDRADENITIS EXCISION      Drainage    INCISION AND DRAINAGE ABSCESS      INCISION AND DRAINAGE OF WOUND Bilateral 2023    Procedure: EXCISION OF HYDRAADENITIS RIGHT GROIN AND LEFT AXILLA;  Surgeon: Young Sanchez MD;  Location: [facility] [place] [facility] [place];  Service: General;  Laterality: Bilateral;    PILONIDAL CYSTECTOMY           Physical Assessment:  Wound 23 Right anterior groin Incision (Active)   Wound Image   23 1035   Dressing Appearance moist drainage 23 1035   Closure None 23 1035   Base moist;red;yellow 23 1035   Red (%), Wound Tissue Color 90 23 1035   Yellow (%), Wound Tissue Color 10 23 1035   Periwound intact 23 1035   Periwound Temperature warm 23 1035   Periwound Skin Turgor soft 23 1035   Edges open 23 1035   Wound Length (cm) 8.7 cm 23 1035   Wound Width (cm) 3 cm 23 1035   Wound Depth (cm) 0.6 cm 23 1035   Wound Surface Area (cm^2) 26.1 cm^2 23 1035   Wound Volume (cm^3) 15.66 cm^3 23 1035   Drainage Characteristics/Odor serosanguineous 23 1035   Drainage Amount small 23 1035   Care, Wound cleansed with;irrigated with;sterile normal saline 23 1035   Dressing Care dressing applied;foam;dressing moistened;border dressing;silicone;abdominal pad 23 1035   Periwound Care absorptive dressing applied 23 1035       Wound 23 Left  axilla Incision (Active)   Wound Image   09/22/23 1035   Dressing Appearance moist drainage 09/22/23 1035   Closure None 09/22/23 1035   Base moist;red;slough 09/22/23 1035   Red (%), Wound Tissue Color 90 09/22/23 1035   Yellow (%), Wound Tissue Color 10 09/22/23 1035   Periwound intact 09/22/23 1035   Periwound Temperature warm 09/22/23 1035   Periwound Skin Turgor soft 09/22/23 1035   Edges open 09/22/23 1035   Wound Length (cm) 4.5 cm 09/22/23 1035   Wound Width (cm) 5.6 cm 09/22/23 1035   Wound Depth (cm) 0.6 cm 09/22/23 1035   Wound Surface Area (cm^2) 25.2 cm^2 09/22/23 1035   Wound Volume (cm^3) 15.12 cm^3 09/22/23 1035   Drainage Characteristics/Odor serosanguineous 09/22/23 1035   Drainage Amount small 09/22/23 1035   Care, Wound cleansed with;irrigated with;sterile normal saline 09/22/23 1035   Dressing Care dressing applied;dressing moistened;foam;border dressing;silicone 09/22/23 1035   Periwound Care absorptive dressing applied 09/22/23 1035        Wound Check / Follow-up:  Patient seen today for wound follow-up and to discuss dressing frequency. Patient and Dr. Sanchez both ammendable to trying Hydrofera dressings [facility].   Patient presents today with topical dressings to sites. Packing and prior dressing removed after showering.   Wound bases to both wounds with red moist tissue and minor amounts of yellow slough to wound stranded over bases. Cleansed and irrigated both wounds copiously. Wound bases filled with Hydrofera foam and then secured with silicone border dressings.     Patient to trial over weekend and will evaluate tolerance on Monday.   If dressing falls out, patient to present for routine wound care over the weekend. Cautioned about showering and saturating dressings and encouraged to only shower prior to dressing changes if needed to prevent saturation of dressings.     Impression: Surgical wounds with closure by secondary intent    Short term goals:  Regain skin integrity. Dressing  changes MWF    Keshia Sosa RN    9/22/2023    19:24 EDT

## 2023-09-22 NOTE — ADDENDUM NOTE
Encounter addended by: Keshia Sosa, RN on: 9/22/2023 7:31 PM   Actions taken: Clinical Note Signed, Order list changed, Diagnosis association updated, Flowsheet accepted, Charge Capture section accepted, Therapy plan modified

## 2023-09-24 ENCOUNTER — HOSPITAL ENCOUNTER (EMERGENCY)
Facility: HOSPITAL | Age: 43
Discharge: HOME | End: 2023-09-24
Attending: EMERGENCY MEDICINE | Admitting: EMERGENCY MEDICINE
Payer: COMMERCIAL

## 2023-09-24 ENCOUNTER — HOSPITAL ENCOUNTER (OUTPATIENT)
Dept: INFUSION THERAPY | Facility: HOSPITAL | Age: 43
Discharge: HOME | End: 2023-09-24
Admitting: NURSE PRACTITIONER
Payer: COMMERCIAL

## 2023-09-24 VITALS
BODY MASS INDEX: 35.08 KG/M2 | HEIGHT: 64 IN | WEIGHT: 205.47 LBS | TEMPERATURE: 98.4 F | RESPIRATION RATE: 20 BRPM | HEART RATE: 61 BPM | SYSTOLIC BLOOD PRESSURE: 105 MMHG | OXYGEN SATURATION: 99 % | DIASTOLIC BLOOD PRESSURE: 64 MMHG

## 2023-09-24 VITALS
HEART RATE: 74 BPM | OXYGEN SATURATION: 100 % | DIASTOLIC BLOOD PRESSURE: 90 MMHG | RESPIRATION RATE: 20 BRPM | SYSTOLIC BLOOD PRESSURE: 134 MMHG

## 2023-09-24 DIAGNOSIS — L73.2 HIDRADENITIS SUPPURATIVA: Primary | ICD-10-CM

## 2023-09-24 LAB
ALBUMIN SERPL-MCNC: 3.8 G/DL (ref 3.5–5.2)
ALBUMIN/GLOB SERPL: 0.9 G/DL
ALP SERPL-CCNC: 61 U/L (ref 39–117)
ALT SERPL W P-5'-P-CCNC: 14 U/L (ref 1–33)
ANION GAP SERPL CALCULATED.3IONS-SCNC: 9.7 MMOL/L (ref 5–15)
AST SERPL-CCNC: 12 U/L (ref 1–32)
BASOPHILS # BLD AUTO: 0.05 10*3/MM3 (ref 0–0.2)
BASOPHILS NFR BLD AUTO: 0.5 % (ref 0–1.5)
BILIRUB SERPL-MCNC: 0.2 MG/DL (ref 0–1.2)
BUN SERPL-MCNC: 11 MG/DL (ref 6–20)
BUN/CREAT SERPL: 12.4 (ref 7–25)
CALCIUM SPEC-SCNC: 9.2 MG/DL (ref 8.6–10.5)
CHLORIDE SERPL-SCNC: 106 MMOL/L (ref 98–107)
CO2 SERPL-SCNC: 22.3 MMOL/L (ref 22–29)
CREAT SERPL-MCNC: 0.89 MG/DL (ref 0.57–1)
DEPRECATED RDW RBC AUTO: 42.5 FL (ref 37–54)
EGFRCR SERPLBLD CKD-EPI 2021: 83.7 ML/MIN/1.73
EOSINOPHIL # BLD AUTO: 0.36 10*3/MM3 (ref 0–0.4)
EOSINOPHIL NFR BLD AUTO: 3.9 % (ref 0.3–6.2)
ERYTHROCYTE [DISTWIDTH] IN BLOOD BY AUTOMATED COUNT: 13.7 % (ref 12.3–15.4)
GLOBULIN UR ELPH-MCNC: 4.2 GM/DL
GLUCOSE SERPL-MCNC: 84 MG/DL (ref 65–99)
HCT VFR BLD AUTO: 33.2 % (ref 34–46.6)
HGB BLD-MCNC: 10.8 G/DL (ref 12–15.9)
IMM GRANULOCYTES # BLD AUTO: 0.02 10*3/MM3 (ref 0–0.05)
IMM GRANULOCYTES NFR BLD AUTO: 0.2 % (ref 0–0.5)
LYMPHOCYTES # BLD AUTO: 3.57 10*3/MM3 (ref 0.7–3.1)
LYMPHOCYTES NFR BLD AUTO: 38.9 % (ref 19.6–45.3)
MCH RBC QN AUTO: 27.3 PG (ref 26.6–33)
MCHC RBC AUTO-ENTMCNC: 32.5 G/DL (ref 31.5–35.7)
MCV RBC AUTO: 84.1 FL (ref 79–97)
MONOCYTES # BLD AUTO: 0.69 10*3/MM3 (ref 0.1–0.9)
MONOCYTES NFR BLD AUTO: 7.5 % (ref 5–12)
NEUTROPHILS NFR BLD AUTO: 4.49 10*3/MM3 (ref 1.7–7)
NEUTROPHILS NFR BLD AUTO: 49 % (ref 42.7–76)
NRBC BLD AUTO-RTO: 0 /100 WBC (ref 0–0.2)
PLATELET # BLD AUTO: 392 10*3/MM3 (ref 140–450)
PMV BLD AUTO: 9.3 FL (ref 6–12)
POTASSIUM SERPL-SCNC: 4 MMOL/L (ref 3.5–5.2)
PROT SERPL-MCNC: 8 G/DL (ref 6–8.5)
RBC # BLD AUTO: 3.95 10*6/MM3 (ref 3.77–5.28)
SODIUM SERPL-SCNC: 138 MMOL/L (ref 136–145)
WBC NRBC COR # BLD: 9.18 10*3/MM3 (ref 3.4–10.8)

## 2023-09-24 PROCEDURE — G0463 HOSPITAL OUTPT CLINIC VISIT: HCPCS

## 2023-09-24 PROCEDURE — 99283 EMERGENCY DEPT VISIT LOW MDM: CPT

## 2023-09-24 PROCEDURE — 85025 COMPLETE CBC W/AUTO DIFF WBC: CPT | Performed by: EMERGENCY MEDICINE

## 2023-09-24 PROCEDURE — 80053 COMPREHEN METABOLIC PANEL: CPT | Performed by: EMERGENCY MEDICINE

## 2023-09-24 RX ORDER — TRANEXAMIC ACID 100 MG/ML
1000 INJECTION, SOLUTION INTRAVENOUS ONCE
Status: COMPLETED | OUTPATIENT
Start: 2023-09-24 | End: 2023-09-24

## 2023-09-24 RX ADMIN — TRANEXAMIC ACID 1000 MG: 100 INJECTION, SOLUTION INTRAVENOUS at 16:34

## 2023-09-24 NOTE — Clinical Note
[facility] EMERGENCY ROOM  [address]  [place] [address]  Phone: [phone]    Pattie Antoine was seen and treated in our emergency department on 9/24/2023.  She may return to work on 09/26/2023.         Thank you for choosing [facility].    Andrea Cabezas MD

## 2023-09-24 NOTE — ED PROVIDER NOTES
Time: 1:20 PM EDT  Date of encounter:  2023  Independent Historian/Clinical History and Information was obtained by:   Patient    History is limited by: N/A    Chief Complaint: Bleeding from postoperative wound      History of Present Illness:  Patient is a 43 y.o. year old female who presents to the emergency department for evaluation of bleeding from her right groin postoperative wound.  The bleeding started this morning approximately 09 30.  Patient was cleaning her wound when it began bleeding heavily.  She had a surgical excision for her hidradenitis suppurativa approximately 10 days ago with Dr. Sanchez.  She has not been dealing with daily heavy bleeding and this is new issue today.  She denies any other symptoms, specifically denying dizziness or passing out.  She went to her outpatient wound care appointment and they sent her here for further treatment.    HPI    Patient Care Team  Primary Care Provider: Mari Conn MD    Past Medical History:     Allergies   Allergen Reactions    Sulfamethoxazole-Trimethoprim Hives     Past Medical History:   Diagnosis Date    Allergies     Condition not found     Mass    Hidradenitis     Right groin wound      Past Surgical History:   Procedure Laterality Date    ABDOMINOPLASTY  2019    With flank liposuction     SECTION      HIDRADENITIS EXCISION      Drainage    INCISION AND DRAINAGE ABSCESS      INCISION AND DRAINAGE OF WOUND Bilateral 2023    Procedure: EXCISION OF HYDRAADENITIS RIGHT GROIN AND LEFT AXILLA;  Surgeon: Young Sanchez MD;  Location: [facility] OR [facility] [place];  Service: General;  Laterality: Bilateral;    PILONIDAL CYSTECTOMY       Family History   Problem Relation Age of Onset    Kidney nephrosis Father     Kidney nephrosis Maternal Grandmother     Diabetes Other     Heart disease Other     Malig Hyperthermia Neg Hx        Home Medications:  Prior to Admission medications    Medication Sig Start Date End Date Taking? Authorizing  "Provider   Adalimumab (Humira Pen) 40 MG/0.8ML Pen-injector Kit Inject  under the skin into the appropriate area as directed Every 14 (Fourteen) Days. LAST DOSE 9-10-23    Shellie Arevalo MD Ashlyna 0.15-0.03 &0.01 MG Take 1 tablet by mouth Daily.    Shellie Arevalo MD   HYDROcodone-acetaminophen (NORCO) 5-325 MG per tablet Take 1 tablet by mouth Every 6 (Six) Hours As Needed for Moderate Pain (Pain). 9/14/23   Young Sanchez MD   minocycline (MINOCIN,DYNACIN) 100 MG capsule Take 1 capsule by mouth Daily As Needed. TAKE FOR HA , NOT CURRENTLY TAKING MED 6/21/23   Shellie Arevalo MD        Social History:   Social History     Tobacco Use    Smoking status: Never    Smokeless tobacco: Never   Vaping Use    Vaping Use: Never used   Substance Use Topics    Alcohol use: Never    Drug use: Never         Review of Systems:  Review of Systems   Constitutional:  Negative for chills and fever.   HENT:  Negative for congestion, rhinorrhea and sore throat.    Eyes:  Negative for photophobia.   Respiratory:  Negative for apnea, cough, chest tightness and shortness of breath.    Cardiovascular:  Negative for chest pain and palpitations.   Gastrointestinal:  Negative for abdominal pain, diarrhea, nausea and vomiting.   Endocrine: Negative.    Genitourinary:  Negative for difficulty urinating and dysuria.   Musculoskeletal:  Negative for back pain, joint swelling and myalgias.   Skin:  Positive for wound. Negative for color change.   Allergic/Immunologic: Negative.    Neurological:  Negative for seizures and headaches.   Psychiatric/Behavioral: Negative.     All other systems reviewed and are negative.     Physical Exam:  /64   Pulse 61   Temp 98.4 °F (36.9 °C) (Oral)   Resp 20   Ht 162.6 cm (64\")   Wt 93.2 kg (205 lb 7.5 oz)   LMP 07/15/2023   SpO2 99%   BMI 35.27 kg/m²     Physical Exam  Vitals and nursing note reviewed.   Constitutional:       General: She is awake.      Appearance: Normal " appearance.   HENT:      Head: Normocephalic and atraumatic.      Nose: Nose normal.      Mouth/Throat:      Mouth: Mucous membranes are moist.   Eyes:      Extraocular Movements: Extraocular movements intact.      Pupils: Pupils are equal, round, and reactive to light.   Cardiovascular:      Rate and Rhythm: Normal rate and regular rhythm.      Heart sounds: Normal heart sounds.   Pulmonary:      Effort: Pulmonary effort is normal. No respiratory distress.      Breath sounds: Normal breath sounds. No wheezing, rhonchi or rales.   Abdominal:      General: Bowel sounds are normal.      Palpations: Abdomen is soft.      Tenderness: There is no abdominal tenderness. There is no guarding or rebound.      Comments: No rigidity   Musculoskeletal:         General: No tenderness. Normal range of motion.      Cervical back: Normal range of motion and neck supple.   Skin:     General: Skin is warm and dry.      Coloration: Skin is not jaundiced.      Comments: Right groin open wound healing well with no cellulitis or abscess.  The inferior portion is bleeding.  There is no arterial bleeding   Neurological:      General: No focal deficit present.      Mental Status: Mental status is at baseline.   Psychiatric:         Mood and Affect: Mood normal.                Procedures:  Procedures      Medical Decision Making:      Comorbidities that affect care:    Hidradenitis suppurativa    External Notes reviewed:    Surgery note: 9/14/2023 general surgery, Dr. Sanchez.  Description: Excision of hidradenitis right groin and left axilla.      The following orders were placed and all results were independently analyzed by me:  Orders Placed This Encounter   Procedures    Comprehensive Metabolic Panel    CBC Auto Differential    CBC & Differential       Medications Given in the Emergency Department:  Medications   Tranexamic Acid Sterile Solution 1,000 mg (1,000 mg Topical Given 9/24/23 7764)        ED Course:         Labs:    Lab  Results (last 24 hours)       Procedure Component Value Units Date/Time    CBC & Differential [[id number]]  (Abnormal) Collected: 09/24/23 1212    Specimen: Blood Updated: 09/24/23 1215    Narrative:      The following orders were created for panel order CBC & Differential.  Procedure                               Abnormality         Status                     ---------                               -----------         ------                     CBC Auto Differential[[id number]]        Abnormal            Final result                 Please view results for these tests on the individual orders.    Comprehensive Metabolic Panel [[id number]] Collected: 09/24/23 1212    Specimen: Blood Updated: 09/24/23 1231     Glucose 84 mg/dL      BUN 11 mg/dL      Creatinine 0.89 mg/dL      Sodium 138 mmol/L      Potassium 4.0 mmol/L      Chloride 106 mmol/L      CO2 22.3 mmol/L      Calcium 9.2 mg/dL      Total Protein 8.0 g/dL      Albumin 3.8 g/dL      ALT (SGPT) 14 U/L      AST (SGOT) 12 U/L      Alkaline Phosphatase 61 U/L      Total Bilirubin 0.2 mg/dL      Globulin 4.2 gm/dL      A/G Ratio 0.9 g/dL      BUN/Creatinine Ratio 12.4     Anion Gap 9.7 mmol/L      eGFR 83.7 mL/min/1.73     Narrative:      GFR Normal >60  Chronic Kidney Disease <60  Kidney Failure <15      CBC Auto Differential [[id number]]  (Abnormal) Collected: 09/24/23 1212    Specimen: Blood Updated: 09/24/23 1215     WBC 9.18 10*3/mm3      RBC 3.95 10*6/mm3      Hemoglobin 10.8 g/dL      Hematocrit 33.2 %      MCV 84.1 fL      MCH 27.3 pg      MCHC 32.5 g/dL      RDW 13.7 %      RDW-SD 42.5 fl      MPV 9.3 fL      Platelets 392 10*3/mm3      Neutrophil % 49.0 %      Lymphocyte % 38.9 %      Monocyte % 7.5 %      Eosinophil % 3.9 %      Basophil % 0.5 %      Immature Grans % 0.2 %      Neutrophils, Absolute 4.49 10*3/mm3      Lymphocytes, Absolute 3.57 10*3/mm3      Monocytes, Absolute 0.69 10*3/mm3      Eosinophils, Absolute 0.36 10*3/mm3      Basophils, Absolute  0.05 10*3/mm3      Immature Grans, Absolute 0.02 10*3/mm3      nRBC 0.0 /100 WBC              Imaging:    No Radiology Exams Resulted Within Past 24 Hours      Differential Diagnosis and Discussion:    Differential diagnosis: Postoperative wound, postoperative bleeding, abscess    All labs were reviewed and interpreted by me.    MDM     Amount and/or Complexity of Data Reviewed  Decide to obtain previous medical records or to obtain history from someone other than the patient: yes             Patient Care Considerations:    CONSULT: I considered consulting general surgery, however bleeding was controlled in the emergency department and vital signs are stable.      Consultants/Shared Management Plan:    None    Social Determinants of Health:    Patient is independent, reliable, and has access to care.       Disposition and Care Coordination:    Discharged: The patient is suitable and stable for discharge with no need for consideration of observation or admission.    I have explained the patient´s condition, diagnoses and treatment plan based on the information available to me at this time. I have answered questions and addressed any concerns. The patient has a good  understanding of the patient´s diagnosis, condition, and treatment plan as can be expected at this point. The vital signs have been stable. The patient´s condition is stable and appropriate for discharge from the emergency department.      The patient will pursue further outpatient evaluation with the primary care physician or other designated or consulting physician as outlined in the discharge instructions. They are agreeable to this plan of care and follow-up instructions have been explained in detail. The patient has received these instructions in written format and have expressed an understanding of the discharge instructions. The patient is aware that any significant change in condition or worsening of symptoms should prompt an immediate return to  this or the closest emergency department or call to 911.    Final diagnoses:   Hidradenitis suppurativa        ED Disposition       ED Disposition   Discharge    Condition   Stable    Comment   --               This medical record created using voice recognition software.             Andrea Cabezas MD  09/24/23 6557

## 2023-09-24 NOTE — NURSING NOTE
Patient came in for wound care due to bleeding noted at home that started at approximately 10 am this morning. Upon removal of dressing blood was trickling out. When purple foam was removed blood began running out more. Grabbed 4x4s upon returning to room with another nurse small pool of blood was under patient and patient was holding paper towel that was soaked all the way through. Quickly applied pressure with entire container of 4x4s which quickly became saturated. Added more and began pushing patient to ED. 4 boxes total of 4X4s had saturated with blood. Transferred care to ED nursing staff.

## 2023-09-25 ENCOUNTER — HOSPITAL ENCOUNTER (OUTPATIENT)
Dept: INFUSION THERAPY | Facility: HOSPITAL | Age: 43
Discharge: HOME | End: 2023-09-25
Admitting: NURSE PRACTITIONER

## 2023-09-25 VITALS
RESPIRATION RATE: 20 BRPM | SYSTOLIC BLOOD PRESSURE: 145 MMHG | TEMPERATURE: 98.2 F | HEART RATE: 76 BPM | OXYGEN SATURATION: 98 % | DIASTOLIC BLOOD PRESSURE: 82 MMHG

## 2023-09-25 DIAGNOSIS — L73.2 HIDRADENITIS SUPPURATIVA: Primary | ICD-10-CM

## 2023-09-25 PROCEDURE — G0463 HOSPITAL OUTPT CLINIC VISIT: HCPCS

## 2023-09-25 NOTE — SIGNIFICANT NOTE
Wound Eval / Progress Noted    [facility]     Patient Name: Pattie Antoine  : 1980  MRN: [id number]  Today's Date: 2023                 Admit Date: 2023    Visit Dx:  No diagnosis found.      * No active hospital problems. *        Past Medical History:   Diagnosis Date    Allergies     Condition not found     Mass    Hidradenitis     Right groin wound         Past Surgical History:   Procedure Laterality Date    ABDOMINOPLASTY  2019    With flank liposuction     SECTION      HIDRADENITIS EXCISION      Drainage    INCISION AND DRAINAGE ABSCESS      INCISION AND DRAINAGE OF WOUND Bilateral 2023    Procedure: EXCISION OF HYDRAADENITIS RIGHT GROIN AND LEFT AXILLA;  Surgeon: Young Sanchez MD;  [place] [facility] [place] [facility] [place]  Service: General;  Laterality: Bilateral;    PILONIDAL CYSTECTOMY           Physical Assessment:  Wound 23 Right anterior groin Incision (Active)   Wound Image   23 1040   Dressing Appearance moist drainage;intact 23 1040   Closure None 23 1040   Base red;moist;granulating 23 1040   Red (%), Wound Tissue Color 100 23 1040   Periwound macerated;moist 23 1040   Periwound Temperature warm 23 1040   Periwound Skin Turgor soft 23 1040   Edges open 23 1040   Wound Length (cm) 8.3 cm 23 1040   Wound Width (cm) 3 cm 23 1040   Wound Depth (cm) 0.9 cm 23 1040   Wound Surface Area (cm^2) 24.9 cm^2 23 1040   Wound Volume (cm^3) 22.41 cm^3 23 1040   Drainage Characteristics/Odor serosanguineous 23 1040   Drainage Amount moderate 23 1040   Care, Wound cleansed with;irrigated with;sterile normal saline 23 1040   Dressing Care dressing applied;gauze, iodoform;packed with;packing strip;border dressing;abdominal pad;silicone 23 1040   Periwound Care absorptive dressing applied 23 1040       Wound 23 Left axilla Incision (Active)   Wound Image   23 1041    Dressing Appearance intact;moist drainage 09/25/23 1040   Closure None 09/25/23 1040   Base red;moist;granulating 09/25/23 1040   Red (%), Wound Tissue Color 100 09/25/23 1040   Periwound dry 09/25/23 1040   Periwound Temperature warm 09/25/23 1040   Periwound Skin Turgor soft 09/25/23 1040   Edges open 09/25/23 1040   Wound Length (cm) 3 cm 09/25/23 1040   Wound Width (cm) 5.3 cm 09/25/23 1040   Wound Depth (cm) 1.1 cm 09/25/23 1040   Wound Surface Area (cm^2) 15.9 cm^2 09/25/23 1040   Wound Volume (cm^3) 17.49 cm^3 09/25/23 1040   Drainage Characteristics/Odor serosanguineous 09/25/23 1040   Drainage Amount small 09/25/23 1040   Care, Wound cleansed with;irrigated with;sterile normal saline 09/25/23 1040   Dressing Care dressing applied;gauze, iodoform;packed with;packing strip;border dressing;silicone 09/25/23 1040   Periwound Care absorptive dressing applied 09/25/23 1040        Wound Check / Follow-up:  Patient seen today for wound check / follow-up on dressing change.   Patient came yesterday to for dressing change because as she was cleaning her groin area, she noted some bleeding under her dressing. She applied gauze and came for a dressing change to 3W. Per recall I was given, once nurse removed the hydrofera dressing from wound bed, increased bleeding was noted and after two boats of 4x4s patient was advised to go to ED. Patient states she went to the ED, she believes they placed some sort of dressing in her wound bed and then applied a pressure dressing.   Patient has been taking Ibuprofen since surgery multiple times / day.   Today dressing to left axilla from Friday was still intact. Dressing moistened and removed. Healthy red moist granular wound bed noted. No slough. Wound responded very well to Hydrofera.   Patient would like to go back to iodoform packing strip this week and will consider Hydrofera again next week but she would like to not have Ibuprofen in her system and see how it does.    Therefore recommending transition back to iodoform packing strip daily dressing changes at present time.   Right groin wound with pressure dressing. Dressing removed. Wound base is red and moist. Small area of thickened clotting noted from 3 - 4 o'clock area. Partially removed with irrigation. Did not remove remainder but no signs of bleeding at present time. Wound irrigated copiously with NS and then filled with iodoform packing strip and secured with silicone border dressing.     Impression: Surgical wounds with closure by secondary intent    Short term goals:  Regain skin integrity. Daily dressing changes.     Keshia Sosa RN    9/25/2023    12:20 EDT

## 2023-09-26 ENCOUNTER — TELEPHONE (OUTPATIENT)
Dept: SURGERY | Facility: CLINIC | Age: 43
End: 2023-09-26
Payer: COMMERCIAL

## 2023-09-26 ENCOUNTER — HOSPITAL ENCOUNTER (OUTPATIENT)
Dept: INFUSION THERAPY | Facility: HOSPITAL | Age: 43
Discharge: HOME | End: 2023-09-26
Admitting: NURSE PRACTITIONER
Payer: COMMERCIAL

## 2023-09-26 VITALS
HEART RATE: 84 BPM | RESPIRATION RATE: 18 BRPM | SYSTOLIC BLOOD PRESSURE: 131 MMHG | OXYGEN SATURATION: 99 % | TEMPERATURE: 98.3 F | DIASTOLIC BLOOD PRESSURE: 81 MMHG

## 2023-09-26 DIAGNOSIS — L73.2 HIDRADENITIS SUPPURATIVA: Primary | ICD-10-CM

## 2023-09-26 PROCEDURE — G0463 HOSPITAL OUTPT CLINIC VISIT: HCPCS

## 2023-09-26 NOTE — TELEPHONE ENCOUNTER
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  PLEASE CALL HER [phone] WHEN IT IS READY FOR .

## 2023-09-27 ENCOUNTER — HOSPITAL ENCOUNTER (OUTPATIENT)
Dept: INFUSION THERAPY | Facility: HOSPITAL | Age: 43
Discharge: HOME | End: 2023-09-27
Admitting: NURSE PRACTITIONER
Payer: COMMERCIAL

## 2023-09-27 VITALS
SYSTOLIC BLOOD PRESSURE: 104 MMHG | HEART RATE: 85 BPM | DIASTOLIC BLOOD PRESSURE: 66 MMHG | OXYGEN SATURATION: 100 % | RESPIRATION RATE: 20 BRPM | TEMPERATURE: 97.9 F

## 2023-09-27 DIAGNOSIS — L73.2 HIDRADENITIS SUPPURATIVA: Primary | ICD-10-CM

## 2023-09-27 PROCEDURE — G0463 HOSPITAL OUTPT CLINIC VISIT: HCPCS

## 2023-09-29 ENCOUNTER — OFFICE VISIT (OUTPATIENT)
Dept: SURGERY | Facility: CLINIC | Age: 43
End: 2023-09-29
Payer: COMMERCIAL

## 2023-09-29 ENCOUNTER — HOSPITAL ENCOUNTER (OUTPATIENT)
Dept: INFUSION THERAPY | Facility: HOSPITAL | Age: 43
Discharge: HOME | End: 2023-09-29
Payer: COMMERCIAL

## 2023-09-29 VITALS
WEIGHT: 195.4 LBS | HEART RATE: 74 BPM | DIASTOLIC BLOOD PRESSURE: 81 MMHG | TEMPERATURE: 98.1 F | HEIGHT: 64 IN | SYSTOLIC BLOOD PRESSURE: 132 MMHG | BODY MASS INDEX: 33.36 KG/M2

## 2023-09-29 VITALS
HEART RATE: 87 BPM | DIASTOLIC BLOOD PRESSURE: 84 MMHG | SYSTOLIC BLOOD PRESSURE: 119 MMHG | TEMPERATURE: 98.2 F | OXYGEN SATURATION: 97 % | RESPIRATION RATE: 20 BRPM

## 2023-09-29 DIAGNOSIS — L73.2 HIDRADENITIS SUPPURATIVA: Primary | ICD-10-CM

## 2023-09-29 PROCEDURE — G0463 HOSPITAL OUTPT CLINIC VISIT: HCPCS

## 2023-09-29 PROCEDURE — 99024 POSTOP FOLLOW-UP VISIT: CPT | Performed by: SURGERY

## 2023-09-29 NOTE — PROGRESS NOTES
Chief Complaint  Follow-up    Subjective          Pattie Antoine presents to [facility] Emergency Department GENERAL SURGERY  History of Present Illness    Pattie Antoine is a 43 y.o. female  who presents today for a postoperative visit.     Patient is here for a follow-up after incision and drainage of hidradenitis of the left axilla and right groin.  She is doing well and had no complaints today.    Past History:  Medical History: has a past medical history of Allergies, Condition not found, Hidradenitis, and Right groin wound.   Surgical History: has a past surgical history that includes Abdominoplasty (2019);  section; Hidradenitis Excision; Incision and Drainage Abscess; Pilonidal Cystectomy; and Incision and drainage of wound (Bilateral, 2023).   Family History: family history includes Diabetes in an other family member; Heart disease in an other family member; Kidney nephrosis in her father and maternal grandmother.   Social History: reports that she has never smoked. She has never used smokeless tobacco. She reports that she does not drink alcohol and does not use drugs.  Allergies: Sulfamethoxazole-trimethoprim       Current Outpatient Medications:     Adalimumab (Humira Pen) 40 MG/0.8ML Pen-injector Kit, Inject  under the skin into the appropriate area as directed Every 14 (Fourteen) Days. LAST DOSE 9-10-23, Disp: , Rfl:     Ashlyna 0.15-0.03 &0.01 MG, Take 1 tablet by mouth Daily., Disp: , Rfl:     HYDROcodone-acetaminophen (NORCO) 5-325 MG per tablet, Take 1 tablet by mouth Every 6 (Six) Hours As Needed for Moderate Pain (Pain). (Patient not taking: Reported on 2023), Disp: 18 tablet, Rfl: 0    minocycline (MINOCIN,DYNACIN) 100 MG capsule, Take 1 capsule by mouth Daily As Needed. TAKE FOR HA , NOT CURRENTLY TAKING MED (Patient not taking: Reported on 2023), Disp: , Rfl:        Physical Exam  Her wounds look fine.  They are granulating nicely.  Objective     Vital Signs:   BP  "132/81   Pulse 74   Temp 98.1 °F (36.7 °C)   Ht 162.6 cm (64\")   Wt 88.6 kg (195 lb 6.4 oz)   BMI 33.54 kg/m²              Assessment and Plan    Diagnoses and all orders for this visit:    1. Hidradenitis suppurativa (Primary)    We will have her continue her current wound care program.  I will see her back in 3 weeks.      "

## 2023-10-02 ENCOUNTER — HOSPITAL ENCOUNTER (OUTPATIENT)
Dept: INFUSION THERAPY | Facility: HOSPITAL | Age: 43
Discharge: HOME | End: 2023-10-02
Admitting: NURSE PRACTITIONER
Payer: COMMERCIAL

## 2023-10-02 VITALS
RESPIRATION RATE: 18 BRPM | DIASTOLIC BLOOD PRESSURE: 79 MMHG | OXYGEN SATURATION: 99 % | HEART RATE: 83 BPM | SYSTOLIC BLOOD PRESSURE: 124 MMHG | TEMPERATURE: 98.1 F

## 2023-10-02 DIAGNOSIS — L73.2 HIDRADENITIS SUPPURATIVA: Primary | ICD-10-CM

## 2023-10-02 PROCEDURE — G0463 HOSPITAL OUTPT CLINIC VISIT: HCPCS

## 2023-10-02 NOTE — SIGNIFICANT NOTE
Wound Eval / Progress Noted    [facility]     Patient Name: Pattie Antoine  : 1980  MRN: [id number]  Today's Date: 10/2/2023                 Admit Date: 10/2/2023    Visit Dx:    ICD-10-CM ICD-9-CM   1. Hidradenitis suppurativa  L73.2 705.83         * No active hospital problems. *        Past Medical History:   Diagnosis Date    Allergies     Condition not found     Mass    Hidradenitis     Right groin wound         Past Surgical History:   Procedure Laterality Date    ABDOMINOPLASTY  2019    With flank liposuction     SECTION      HIDRADENITIS EXCISION      Drainage    INCISION AND DRAINAGE ABSCESS      INCISION AND DRAINAGE OF WOUND Bilateral 2023    Procedure: EXCISION OF HYDRAADENITIS RIGHT GROIN AND LEFT AXILLA;  Surgeon: Young Sanchez MD;  [place] [facility] [place] [facility] [place]  Service: General;  Laterality: Bilateral;    PILONIDAL CYSTECTOMY           Physical Assessment:  Wound 23 Right anterior groin Incision (Active)   Wound Image   10/02/23 1035   Dressing Appearance moist drainage;dressing loose 10/02/23 1035   Closure None 10/02/23 1035   Base red;moist;granulating 10/02/23 1035   Red (%), Wound Tissue Color 100 10/02/23 1035   Periwound intact;dry 10/02/23 1035   Periwound Temperature warm 10/02/23 1035   Periwound Skin Turgor soft 10/02/23 1035   Edges open 10/02/23 1035   Wound Length (cm) 8.5 cm 10/02/23 1035   Wound Width (cm) 2.6 cm 10/02/23 1035   Wound Depth (cm) 0.7 cm 10/02/23 1035   Wound Surface Area (cm^2) 22.1 cm^2 10/02/23 1035   Wound Volume (cm^3) 15.47 cm^3 10/02/23 1035   Drainage Characteristics/Odor serosanguineous 10/02/23 1035   Drainage Amount small 10/02/23 1035   Care, Wound cleansed with;irrigated with;sterile normal saline 10/02/23 1035   Dressing Care dressing applied;foam;silicone;border dressing 10/02/23 1035   Periwound Care absorptive dressing applied 10/02/23 1035       Wound 23 Left axilla Incision (Active)   Wound Image   10/02/23 1035    Dressing Appearance dressing loose;moist drainage 10/02/23 1035   Closure None 10/02/23 1035   Base red;moist;bleeding 10/02/23 1035   Red (%), Wound Tissue Color 100 10/02/23 1035   Periwound intact;dry 10/02/23 1035   Periwound Temperature warm 10/02/23 1035   Periwound Skin Turgor soft 10/02/23 1035   Edges open 10/02/23 1035   Wound Length (cm) 3 cm 10/02/23 1035   Wound Width (cm) 5 cm 10/02/23 1035   Wound Depth (cm) 0.6 cm 10/02/23 1035   Wound Surface Area (cm^2) 15 cm^2 10/02/23 1035   Wound Volume (cm^3) 9 cm^3 10/02/23 1035   Drainage Characteristics/Odor serosanguineous 10/02/23 1035   Drainage Amount small 10/02/23 1035   Care, Wound cleansed with;irrigated with;sterile normal saline 10/02/23 1035   Dressing Care dressing applied;foam;silicone;border dressing 10/02/23 1035   Periwound Care absorptive dressing applied 10/02/23 1035        Wound Check / Follow-up:  Patient seen today for a wound check and dressing change. Patient took shower prior to arrival to the appointment; dry gauze to the wound base.  Wound bases to the right groin and left axilla with red moist granular tissue. Periwound is dry and intact. Cleansed and irrigated with NS. Filled wounds with NS moistened purple foam. Patient is responding to the current treatment with the evidence of wound depth decreasing and the wound edges jamia.   Patient to come to appointments on [facility].    Impression: surgical wound with closure by secondary intent    Short term goals:  regain skin integrity, skin protection, daily dressing changes    Antoinette Casey RN    10/2/2023    11:06 EDT

## 2023-10-04 ENCOUNTER — HOSPITAL ENCOUNTER (OUTPATIENT)
Dept: INFUSION THERAPY | Facility: HOSPITAL | Age: 43
Discharge: HOME | End: 2023-10-04
Admitting: NURSE PRACTITIONER
Payer: COMMERCIAL

## 2023-10-04 VITALS
DIASTOLIC BLOOD PRESSURE: 75 MMHG | SYSTOLIC BLOOD PRESSURE: 129 MMHG | OXYGEN SATURATION: 99 % | HEART RATE: 76 BPM | TEMPERATURE: 98.6 F | RESPIRATION RATE: 20 BRPM

## 2023-10-04 DIAGNOSIS — L73.2 HIDRADENITIS SUPPURATIVA: Primary | ICD-10-CM

## 2023-10-04 PROCEDURE — G0463 HOSPITAL OUTPT CLINIC VISIT: HCPCS

## 2023-10-06 ENCOUNTER — HOSPITAL ENCOUNTER (OUTPATIENT)
Dept: INFUSION THERAPY | Facility: HOSPITAL | Age: 43
Discharge: HOME | End: 2023-10-06
Payer: COMMERCIAL

## 2023-10-06 VITALS
OXYGEN SATURATION: 100 % | TEMPERATURE: 98.5 F | DIASTOLIC BLOOD PRESSURE: 85 MMHG | HEART RATE: 83 BPM | RESPIRATION RATE: 16 BRPM | SYSTOLIC BLOOD PRESSURE: 131 MMHG

## 2023-10-06 DIAGNOSIS — L73.2 HIDRADENITIS SUPPURATIVA: Primary | ICD-10-CM

## 2023-10-06 PROCEDURE — G0463 HOSPITAL OUTPT CLINIC VISIT: HCPCS

## 2023-10-06 NOTE — SIGNIFICANT NOTE
Wound Eval / Progress Noted    [facility]     Patient Name: Pattie Antoine  : 1980  MRN: [id number]  Today's Date: 10/6/2023                 Admit Date: 10/6/2023    Visit Dx:    ICD-10-CM ICD-9-CM   1. Hidradenitis suppurativa  L73.2 705.83         * No active hospital problems. *        Past Medical History:   Diagnosis Date    Allergies     Condition not found     Mass    Hidradenitis     Right groin wound         Past Surgical History:   Procedure Laterality Date    ABDOMINOPLASTY  2019    With flank liposuction     SECTION      HIDRADENITIS EXCISION      Drainage    INCISION AND DRAINAGE ABSCESS      INCISION AND DRAINAGE OF WOUND Bilateral 2023    Procedure: EXCISION OF HYDRAADENITIS RIGHT GROIN AND LEFT AXILLA;  Surgeon: Young Sanchez MD;  Location: [facility] OR [facility] [place];  Service: General;  Laterality: Bilateral;    PILONIDAL CYSTECTOMY           Physical Assessment:  Wound 23 Right anterior groin Incision (Active)   Wound Image   10/06/23 1120   Dressing Appearance intact;moist drainage 10/06/23 1120   Closure None 10/06/23 1120   Base red;moist;granulating 10/06/23 1120   Red (%), Wound Tissue Color 100 10/06/23 1120   Periwound intact;dry 10/06/23 1120   Periwound Temperature warm 10/06/23 1120   Periwound Skin Turgor soft 10/06/23 1120   Edges open 10/06/23 1120   Drainage Characteristics/Odor serosanguineous 10/06/23 112   Drainage Amount small 10/06/23 1120   Care, Wound cleansed with;irrigated with;sterile normal saline 10/06/23 1120   Dressing Care dressing applied;foam;silicone;border dressing;abdominal pad 10/06/23 112   Periwound Care absorptive dressing applied 10/06/23 1120       Wound 23 Left axilla Incision (Active)   Wound Image   10/06/23 1120   Dressing Appearance intact;moist drainage 10/06/23 1120   Closure None 10/06/23 1120   Base red;moist;granulating 10/06/23 1120   Red (%), Wound Tissue Color 100 10/06/23 1120   Periwound intact;dry 10/06/23 1120    Periwound Temperature warm 10/06/23 1120   Periwound Skin Turgor soft 10/06/23 1120   Edges open 10/06/23 1120   Drainage Characteristics/Odor serosanguineous 10/06/23 1120   Drainage Amount small 10/06/23 1120   Care, Wound cleansed with;irrigated with;sterile normal saline 10/06/23 1120   Dressing Care dressing applied;foam;silicone;border dressing 10/06/23 1120   Periwound Care absorptive dressing applied 10/06/23 1120        Wound Check / Follow-up:  Patient seen today for a wound check and dressing change. Patient with granular red moist wound bases to the left axilla and right groin. Periwound is soft and intact with evidence of scarring present. Cleansed with NS and gauze. Will recommend to continue current wound care at this time as it appears the patients wounds are responding.     Patient concerned about having a reoccurrence of bleeding again over the weekend; instructed patient to seek medical attention if the bleeding is unable to be stopped with pressure.     Impression: surgical wounds with closure by secondary intent    Short term goals:  regain skin integrity, every other day dressing changes    Antoinette Casey RN    10/6/2023    11:30 EDT

## 2023-10-09 ENCOUNTER — HOSPITAL ENCOUNTER (OUTPATIENT)
Dept: INFUSION THERAPY | Facility: HOSPITAL | Age: 43
Discharge: HOME | End: 2023-10-09
Admitting: NURSE PRACTITIONER
Payer: COMMERCIAL

## 2023-10-09 VITALS
OXYGEN SATURATION: 98 % | RESPIRATION RATE: 20 BRPM | DIASTOLIC BLOOD PRESSURE: 74 MMHG | HEART RATE: 76 BPM | TEMPERATURE: 98.6 F | SYSTOLIC BLOOD PRESSURE: 128 MMHG

## 2023-10-09 DIAGNOSIS — L73.2 HIDRADENITIS SUPPURATIVA: Primary | ICD-10-CM

## 2023-10-09 PROCEDURE — G0463 HOSPITAL OUTPT CLINIC VISIT: HCPCS

## 2023-10-09 NOTE — NURSING NOTE
Wound Eval / Progress Noted    [facility]     Patient Name: Pattie Antoine  : 1980  MRN: [id number]  Today's Date: 10/9/2023                 Admit Date: 10/9/2023    Visit Dx:    ICD-10-CM ICD-9-CM   1. Hidradenitis suppurativa  L73.2 705.83         * No active hospital problems. *        Past Medical History:   Diagnosis Date    Allergies     Condition not found     Mass    Hidradenitis     Right groin wound         Past Surgical History:   Procedure Laterality Date    ABDOMINOPLASTY  2019    With flank liposuction     SECTION      HIDRADENITIS EXCISION      Drainage    INCISION AND DRAINAGE ABSCESS      INCISION AND DRAINAGE OF WOUND Bilateral 2023    Procedure: EXCISION OF HYDRAADENITIS RIGHT GROIN AND LEFT AXILLA;  Surgeon: Young Sanchez MD;  [place] [facility] [place] [facility] [place]  Service: General;  Laterality: Bilateral;    PILONIDAL CYSTECTOMY           Physical Assessment:  Wound 23 Right anterior groin Incision (Active)   Wound Image   10/09/23 1005   Dressing Appearance moist drainage;intact 10/09/23 1005   Closure None 10/09/23 1005   Base red;moist;granulating 10/09/23 1005   Red (%), Wound Tissue Color 100 10/09/23 1005   Periwound intact;dry 10/09/23 1005   Periwound Temperature warm 10/09/23 1005   Periwound Skin Turgor soft 10/09/23 1005   Edges open 10/09/23 1005   Wound Length (cm) 8.1 cm 10/09/23 1005   Wound Width (cm) 2.4 cm 10/09/23 1005   Wound Depth (cm) 0.4 cm 10/09/23 1005   Wound Surface Area (cm^2) 19.44 cm^2 10/09/23 1005   Wound Volume (cm^3) 7.776 cm^3 10/09/23 1005   Drainage Characteristics/Odor serosanguineous 10/09/23 100   Drainage Amount small 10/09/23 1005   Care, Wound cleansed with;irrigated with;sterile normal saline 10/09/23 1005   Dressing Care dressing applied;foam;silicone;border dressing;other (see comments);dressing moistened 10/09/23 1005   Periwound Care absorptive dressing applied 10/09/23 1005       Wound 23 Left axilla Incision  (Active)   Wound Image   10/09/23 1005   Dressing Appearance intact;moist drainage 10/09/23 1005   Closure None 10/09/23 1005   Base red;moist;granulating 10/09/23 1005   Red (%), Wound Tissue Color 100 10/09/23 1005   Periwound intact;dry 10/09/23 1005   Periwound Temperature warm 10/09/23 1005   Periwound Skin Turgor soft 10/09/23 1005   Edges open 10/09/23 1005   Wound Length (cm) 2.1 cm 10/09/23 1005   Wound Width (cm) 5.1 cm 10/09/23 1005   Wound Depth (cm) 0.4 cm 10/09/23 1005   Wound Surface Area (cm^2) 10.71 cm^2 10/09/23 1005   Wound Volume (cm^3) 4.284 cm^3 10/09/23 1005   Drainage Characteristics/Odor serosanguineous 10/09/23 1005   Drainage Amount small 10/09/23 1005   Care, Wound cleansed with;irrigated with;sterile normal saline 10/09/23 1005   Dressing Care dressing applied;dressing moistened;foam;silicone;border dressing;abdominal pad 10/09/23 1005   Periwound Care absorptive dressing applied 10/09/23 1005        Wound Check / Follow-up: Patient seen today for a wound check. Patient denies any issues with bleeding over the weekend. Wound dressings are loose and moist.   Wound bases to the left axilla and right groin with granular red moist tissue. Periwound is soft and dry; no induration noted. Cleansed and irrigated with NS. Applied NS moistened purple foam to the wound bases and secured with a silicone border dressing.   Recommending to continue current wound care at this time, the patient continues to respond appropriately     Impression: surgical wounds with closure by secondary intent    Short term goals:  regain skin integrity, every other day dressing changes    Antoinette Casey RN    10/9/2023    10:10 EDT

## 2023-10-11 ENCOUNTER — HOSPITAL ENCOUNTER (OUTPATIENT)
Dept: INFUSION THERAPY | Facility: HOSPITAL | Age: 43
Discharge: HOME | End: 2023-10-11
Admitting: NURSE PRACTITIONER
Payer: COMMERCIAL

## 2023-10-11 VITALS
HEART RATE: 79 BPM | TEMPERATURE: 98.4 F | OXYGEN SATURATION: 100 % | RESPIRATION RATE: 20 BRPM | SYSTOLIC BLOOD PRESSURE: 125 MMHG | DIASTOLIC BLOOD PRESSURE: 72 MMHG

## 2023-10-11 DIAGNOSIS — L73.2 HIDRADENITIS SUPPURATIVA: Primary | ICD-10-CM

## 2023-10-11 PROCEDURE — G0463 HOSPITAL OUTPT CLINIC VISIT: HCPCS

## 2023-10-13 ENCOUNTER — HOSPITAL ENCOUNTER (OUTPATIENT)
Dept: INFUSION THERAPY | Facility: HOSPITAL | Age: 43
Discharge: HOME | End: 2023-10-13
Payer: COMMERCIAL

## 2023-10-13 VITALS
OXYGEN SATURATION: 99 % | RESPIRATION RATE: 20 BRPM | HEART RATE: 83 BPM | TEMPERATURE: 98.5 F | DIASTOLIC BLOOD PRESSURE: 78 MMHG | SYSTOLIC BLOOD PRESSURE: 127 MMHG

## 2023-10-13 DIAGNOSIS — L73.2 HIDRADENITIS SUPPURATIVA: Primary | ICD-10-CM

## 2023-10-13 PROCEDURE — G0463 HOSPITAL OUTPT CLINIC VISIT: HCPCS

## 2023-10-16 ENCOUNTER — HOSPITAL ENCOUNTER (OUTPATIENT)
Dept: INFUSION THERAPY | Facility: HOSPITAL | Age: 43
Discharge: HOME | End: 2023-10-16
Admitting: NURSE PRACTITIONER
Payer: COMMERCIAL

## 2023-10-16 VITALS
DIASTOLIC BLOOD PRESSURE: 79 MMHG | SYSTOLIC BLOOD PRESSURE: 132 MMHG | HEART RATE: 80 BPM | RESPIRATION RATE: 20 BRPM | OXYGEN SATURATION: 100 % | TEMPERATURE: 97.7 F

## 2023-10-16 DIAGNOSIS — L73.2 HIDRADENITIS SUPPURATIVA: Primary | ICD-10-CM

## 2023-10-16 PROCEDURE — G0463 HOSPITAL OUTPT CLINIC VISIT: HCPCS

## 2023-10-16 NOTE — ADDENDUM NOTE
Encounter addended by: Holly Spencer RN on: 10/16/2023 1:08 PM   Actions taken: Flowsheet data copied forward, Flowsheet accepted, Clinical Note Signed, Therapy plan modified, Charge Capture section accepted

## 2023-10-16 NOTE — SIGNIFICANT NOTE
Wound Eval / Progress Noted    [facility]     Patient Name: Pattie Antoine  : 1980  MRN: [id number]  Today's Date: 10/16/2023                 Admit Date: 10/16/2023    Visit Dx:    ICD-10-CM ICD-9-CM   1. Hidradenitis suppurativa  L73.2 705.83         * No active hospital problems. *        Past Medical History:   Diagnosis Date    Allergies     Condition not found     Mass    Hidradenitis     Right groin wound         Past Surgical History:   Procedure Laterality Date    ABDOMINOPLASTY  2019    With flank liposuction     SECTION      HIDRADENITIS EXCISION      Drainage    INCISION AND DRAINAGE ABSCESS      INCISION AND DRAINAGE OF WOUND Bilateral 2023    Procedure: EXCISION OF HYDRAADENITIS RIGHT GROIN AND LEFT AXILLA;  Surgeon: Young Sanchez MD;  Location: [facility] OR [facility] [place];  Service: General;  Laterality: Bilateral;    PILONIDAL CYSTECTOMY           Physical Assessment:  Wound 23 Right anterior groin Incision (Active)   Wound Image   10/16/23 1008   Dressing Appearance dry;intact 10/16/23 1008   Closure None 10/16/23 1008   Base red;moist;granulating 10/16/23 1008   Periwound dry;intact 10/16/23 1008   Periwound Temperature warm 10/16/23 1008   Periwound Skin Turgor soft 10/16/23 1008   Edges open 10/16/23 1008   Drainage Characteristics/Odor serosanguineous 10/16/23 1008   Drainage Amount scant 10/16/23 1008   Care, Wound cleansed with;irrigated with;sterile normal saline 10/16/23 1008   Dressing Care dressing applied;foam;silicone;border dressing 10/16/23 1008   Periwound Care absorptive dressing applied 10/16/23 1008       Wound 23 Left axilla Incision (Active)   Wound Image   10/16/23 1008   Dressing Appearance dry;intact 10/16/23 1008   Closure None 10/16/23 1008   Base red;moist;granulating 10/16/23 1008   Periwound dry;intact 10/16/23 1008   Periwound Temperature warm 10/16/23 1008   Periwound Skin Turgor soft 10/16/23 1008   Edges open 10/16/23 1008   Drainage  Characteristics/Odor serosanguineous 10/16/23 1008   Drainage Amount scant 10/16/23 1008   Care, Wound cleansed with;irrigated with;sterile normal saline 10/16/23 1008   Dressing Care dressing applied;foam;silicone;border dressing 10/16/23 1008   Periwound Care absorptive dressing applied 10/16/23 1008      Wound Check / Follow-up:  Patient seen today for a wound check. Wound bases to left axilla and right groin with moist, red granular  tissue. Periwound is dry and intact. Cleansed and irrigated wounds with normal saline. Applied purple foam (Hydrofera Ready) to wound bases and secured with silicone border dressings. Recommending to continue current care.      Impression: Surgical wounds.      Short term goals: Regain skin integrity, skin protection, every other day dressing changes.      Holly Spencer RN    10/16/2023    13:06 EDT

## 2023-10-18 ENCOUNTER — HOSPITAL ENCOUNTER (OUTPATIENT)
Dept: INFUSION THERAPY | Facility: HOSPITAL | Age: 43
Discharge: HOME | End: 2023-10-18
Admitting: NURSE PRACTITIONER
Payer: COMMERCIAL

## 2023-10-18 VITALS
OXYGEN SATURATION: 98 % | HEART RATE: 85 BPM | TEMPERATURE: 97.9 F | SYSTOLIC BLOOD PRESSURE: 126 MMHG | RESPIRATION RATE: 20 BRPM | DIASTOLIC BLOOD PRESSURE: 77 MMHG

## 2023-10-18 DIAGNOSIS — L73.2 HIDRADENITIS SUPPURATIVA: Primary | ICD-10-CM

## 2023-10-18 PROCEDURE — G0463 HOSPITAL OUTPT CLINIC VISIT: HCPCS

## 2023-10-20 ENCOUNTER — OFFICE VISIT (OUTPATIENT)
Dept: SURGERY | Facility: CLINIC | Age: 43
End: 2023-10-20
Payer: COMMERCIAL

## 2023-10-20 VITALS
DIASTOLIC BLOOD PRESSURE: 78 MMHG | HEIGHT: 64 IN | RESPIRATION RATE: 18 BRPM | WEIGHT: 197 LBS | BODY MASS INDEX: 33.63 KG/M2 | SYSTOLIC BLOOD PRESSURE: 126 MMHG

## 2023-10-20 DIAGNOSIS — L73.2 HIDRADENITIS SUPPURATIVA: Primary | ICD-10-CM

## 2023-10-20 PROCEDURE — 99024 POSTOP FOLLOW-UP VISIT: CPT | Performed by: SURGERY

## 2023-10-20 NOTE — PROGRESS NOTES
Chief Complaint  Follow-up    Subjective          Pattie Antoine presents to [facility] GENER[facility]ERY  History of Present Illness    Pattie Antoine is a 43 y.o. female  who presents today for a postoperative visit.     Patient is here for a follow-up after excision of recurrent hidradenitis.  She is doing okay and had no complaints today.      Past History:  Medical History: has a past medical history of Allergies, Condition not found, Hidradenitis, and Right groin wound.   Surgical History: has a past surgical history that includes Abdominoplasty (2019);  section; Hidradenitis Excision; Incision and Drainage Abscess; Pilonidal Cystectomy; and Incision and drainage of wound (Bilateral, 2023).   Family History: family history includes Diabetes in an other family member; Heart disease in an other family member; Kidney nephrosis in her father and maternal grandmother.   Social History: reports that she has never smoked. She has never used smokeless tobacco. She reports that she does not drink alcohol and does not use drugs.  Allergies: Sulfamethoxazole-trimethoprim       Current Outpatient Medications:     Adalimumab (Humira Pen) 40 MG/0.8ML Pen-injector Kit, Inject  under the skin into the appropriate area as directed Every 14 (Fourteen) Days. LAST DOSE 9-10-23, Disp: , Rfl:     Ashlyna 0.15-0.03 &0.01 MG, Take 1 tablet by mouth Daily., Disp: , Rfl:     HYDROcodone-acetaminophen (NORCO) 5-325 MG per tablet, Take 1 tablet by mouth Every 6 (Six) Hours As Needed for Moderate Pain (Pain). (Patient not taking: Reported on 2023), Disp: 18 tablet, Rfl: 0    minocycline (MINOCIN,DYNACIN) 100 MG capsule, Take 1 capsule by mouth Daily As Needed. TAKE FOR HA , NOT CURRENTLY TAKING MED (Patient not taking: Reported on 2023), Disp: , Rfl:        Physical Exam  The wounds look good.  They are granulating nicely and are quite a bit smaller.  Objective     Vital Signs:   /78    "Resp 18   Ht 162.6 cm (64\")   Wt 89.4 kg (197 lb)   BMI 33.81 kg/m²              Assessment and Plan    Diagnoses and all orders for this visit:    1. Hidradenitis suppurativa (Primary)    This point we seem to be doing pretty well.  We will continue her current wound care.      "

## 2023-10-23 ENCOUNTER — HOSPITAL ENCOUNTER (OUTPATIENT)
Dept: INFUSION THERAPY | Facility: HOSPITAL | Age: 43
Discharge: HOME | End: 2023-10-23
Admitting: NURSE PRACTITIONER
Payer: COMMERCIAL

## 2023-10-23 VITALS
HEART RATE: 79 BPM | OXYGEN SATURATION: 100 % | SYSTOLIC BLOOD PRESSURE: 141 MMHG | DIASTOLIC BLOOD PRESSURE: 83 MMHG | RESPIRATION RATE: 16 BRPM | TEMPERATURE: 98.3 F

## 2023-10-23 DIAGNOSIS — L73.2 HIDRADENITIS SUPPURATIVA: Primary | ICD-10-CM

## 2023-10-23 PROCEDURE — G0463 HOSPITAL OUTPT CLINIC VISIT: HCPCS

## 2023-10-23 NOTE — SIGNIFICANT NOTE
Wound Eval / Progress Noted    [facility]     Patient Name: Pattie Antoine  : 1980  MRN: [id number]  Today's Date: 10/23/2023                 Admit Date: 10/23/2023    Visit Dx:  No diagnosis found.      * No active hospital problems. *        Past Medical History:   Diagnosis Date    Allergies     Condition not found     Mass    Hidradenitis     Right groin wound         Past Surgical History:   Procedure Laterality Date    ABDOMINOPLASTY  2019    With flank liposuction     SECTION      HIDRADENITIS EXCISION      Drainage    INCISION AND DRAINAGE ABSCESS      INCISION AND DRAINAGE OF WOUND Bilateral 2023    Procedure: EXCISION OF HYDRAADENITIS RIGHT GROIN AND LEFT AXILLA;  Surgeon: Young Sanchez MD;  [place] [facility] [place] [facility] [place]  Service: General;  Laterality: Bilateral;    PILONIDAL CYSTECTOMY           Physical Assessment:  Wound 23 Right anterior groin Incision (Active)   Wound Image   10/23/23 0944   Dressing Appearance dry;intact 10/23/23 0944   Closure None 10/23/23 0944   Base moist;pink;red 10/23/23 0944   Periwound dry;intact 10/23/23 0944   Periwound Temperature warm 10/23/23 0944   Periwound Skin Turgor soft 10/23/23 0944   Edges open 10/23/23 0944   Wound Length (cm) 5.8 cm 10/23/23 0944   Wound Width (cm) 1.1 cm 10/23/23 0944   Wound Surface Area (cm^2) 6.38 cm^2 10/23/23 0944   Drainage Characteristics/Odor bleeding controlled;serosanguineous 10/23/23 0944   Drainage Amount small 10/23/23 0944   Care, Wound cleansed with;sterile normal saline 10/23/23 0944   Dressing Care dressing applied;foam;silicone;border dressing;abdominal pad 10/23/23 0944   Periwound Care absorptive dressing applied 10/23/23 0944       Wound 23 Left axilla Incision (Active)   Wound Image   10/23/23 0944   Dressing Appearance dry;intact 10/23/23 0944   Closure None 10/23/23 0944   Base moist;pink;red 10/23/23 0944   Periwound dry;intact 10/23/23 0944   Periwound Temperature warm 10/23/23  0944   Periwound Skin Turgor soft 10/23/23 0944   Edges open 10/23/23 0944   Wound Length (cm) 1.2 cm 10/23/23 0944   Wound Width (cm) 3.8 cm 10/23/23 0944   Wound Surface Area (cm^2) 4.56 cm^2 10/23/23 0944   Drainage Characteristics/Odor bleeding controlled;serosanguineous 10/23/23 0944   Drainage Amount small 10/23/23 0944   Care, Wound cleansed with;sterile normal saline 10/23/23 0944   Dressing Care dressing applied;foam;silicone;border dressing 10/23/23 0944   Periwound Care absorptive dressing applied 10/23/23 0944      Wound Check / Follow-up: Patient seen today for wound follow-up and dressing change. Wound bases to left axilla and right groin with moist red and pink tissue. Small amount of bleeding and serosanguineous drainage noted. Periwound tissue dry and intact to both areas. Cleansed wounds with normal saline and gauze. Applied purple foam (Hydrofera Ready) to wound bases and secured with silicone border dressings. Patient requested ABD pad to right groin as well for extra protection. Recommending to continue current care.       Impression: Surgical wounds.       Short term goals: Regain skin integrity, skin protection, every other day dressing changes.        Holly Spencer RN    10/23/2023    10:14 EDT

## 2023-10-25 ENCOUNTER — HOSPITAL ENCOUNTER (OUTPATIENT)
Dept: INFUSION THERAPY | Facility: HOSPITAL | Age: 43
Discharge: HOME | End: 2023-10-25
Admitting: NURSE PRACTITIONER
Payer: COMMERCIAL

## 2023-10-25 VITALS
HEART RATE: 70 BPM | SYSTOLIC BLOOD PRESSURE: 139 MMHG | OXYGEN SATURATION: 100 % | DIASTOLIC BLOOD PRESSURE: 78 MMHG | TEMPERATURE: 98.1 F | RESPIRATION RATE: 20 BRPM

## 2023-10-25 DIAGNOSIS — L73.2 HIDRADENITIS SUPPURATIVA: Primary | ICD-10-CM

## 2023-10-25 PROCEDURE — G0463 HOSPITAL OUTPT CLINIC VISIT: HCPCS

## 2023-10-27 ENCOUNTER — HOSPITAL ENCOUNTER (OUTPATIENT)
Dept: INFUSION THERAPY | Facility: HOSPITAL | Age: 43
Discharge: HOME | End: 2023-10-27
Admitting: NURSE PRACTITIONER
Payer: COMMERCIAL

## 2023-10-27 VITALS
TEMPERATURE: 98.3 F | SYSTOLIC BLOOD PRESSURE: 144 MMHG | OXYGEN SATURATION: 97 % | DIASTOLIC BLOOD PRESSURE: 82 MMHG | RESPIRATION RATE: 20 BRPM | HEART RATE: 69 BPM

## 2023-10-27 DIAGNOSIS — L73.2 HIDRADENITIS SUPPURATIVA: Primary | ICD-10-CM

## 2023-10-27 PROCEDURE — G0463 HOSPITAL OUTPT CLINIC VISIT: HCPCS

## 2023-10-30 ENCOUNTER — HOSPITAL ENCOUNTER (OUTPATIENT)
Dept: INFUSION THERAPY | Facility: HOSPITAL | Age: 43
Discharge: HOME | End: 2023-10-30
Admitting: NURSE PRACTITIONER
Payer: COMMERCIAL

## 2023-10-30 VITALS
TEMPERATURE: 98.3 F | SYSTOLIC BLOOD PRESSURE: 142 MMHG | RESPIRATION RATE: 18 BRPM | OXYGEN SATURATION: 99 % | HEART RATE: 83 BPM | DIASTOLIC BLOOD PRESSURE: 81 MMHG

## 2023-10-30 DIAGNOSIS — L73.2 HIDRADENITIS SUPPURATIVA: Primary | ICD-10-CM

## 2023-10-30 PROCEDURE — G0463 HOSPITAL OUTPT CLINIC VISIT: HCPCS

## 2023-10-30 NOTE — SIGNIFICANT NOTE
Wound Eval / Progress Noted    [facility]     Patient Name: Pattie Antoine  : 1980  MRN: [id number]  Today's Date: 10/30/2023                 Admit Date: 10/30/2023    Visit Dx:    ICD-10-CM ICD-9-CM   1. Hidradenitis suppurativa  L73.2 705.83         * No active hospital problems. *        Past Medical History:   Diagnosis Date    Allergies     Condition not found     Mass    Hidradenitis     Right groin wound         Past Surgical History:   Procedure Laterality Date    ABDOMINOPLASTY  2019    With flank liposuction     SECTION      HIDRADENITIS EXCISION      Drainage    INCISION AND DRAINAGE ABSCESS      INCISION AND DRAINAGE OF WOUND Bilateral 2023    Procedure: EXCISION OF HYDRAADENITIS RIGHT GROIN AND LEFT AXILLA;  Surgeon: Young Sanchez MD;  [place] [facility] [place] [facility] [place]  Service: General;  Laterality: Bilateral;    PILONIDAL CYSTECTOMY           Physical Assessment:  Wound 23 Right anterior groin Incision (Active)   Wound Image   10/30/23 0938   Dressing Appearance intact;moist drainage 10/30/23 0938   Closure None 10/30/23 0938   Base red;granulating;moist 10/30/23 0938   Red (%), Wound Tissue Color 100 10/30/23 0938   Periwound intact 10/30/23 0938   Periwound Temperature warm 10/30/23 0938   Periwound Skin Turgor soft 10/30/23 0938   Edges open 10/30/23 0938   Wound Length (cm) 6 cm 10/30/23 0938   Wound Width (cm) 1 cm 10/30/23 0938   Wound Depth (cm) 0.1 cm 10/30/23 0938   Wound Surface Area (cm^2) 6 cm^2 10/30/23 0938   Wound Volume (cm^3) 0.6 cm^3 10/30/23 0938   Drainage Characteristics/Odor serosanguineous 10/30/23 0938   Drainage Amount small 10/30/23 0938   Care, Wound cleansed with;sterile normal saline 10/30/23 0938   Dressing Care dressing applied;dressing moistened;border dressing;foam;silicone 10/30/23 0938   Periwound Care absorptive dressing applied 10/30/23 0938       Wound 09/14/23 Left axilla Incision (Active)   Wound Image   10/30/23 0921   Dressing  Appearance intact;moist drainage 10/30/23 0938   Closure None 10/30/23 0938   Base moist;red;granulating 10/30/23 0938   Red (%), Wound Tissue Color 100 10/30/23 0938   Periwound intact 10/30/23 0938   Periwound Temperature warm 10/30/23 0938   Periwound Skin Turgor soft 10/30/23 0938   Edges open 10/30/23 0938   Wound Length (cm) 0.7 cm 10/30/23 0938   Wound Width (cm) 2.8 cm 10/30/23 0938   Wound Depth (cm) 0.2 cm 10/30/23 0938   Wound Surface Area (cm^2) 1.96 cm^2 10/30/23 0938   Wound Volume (cm^3) 0.392 cm^3 10/30/23 0938   Drainage Characteristics/Odor serosanguineous 10/30/23 0938   Drainage Amount small 10/30/23 0938   Care, Wound cleansed with;sterile normal saline 10/30/23 0938   Dressing Care dressing applied;dressing moistened;foam;border dressing;silicone 10/30/23 0938   Periwound Care absorptive dressing applied 10/30/23 0938        Wound Check / Follow-up:  Patient seen today for wound follow-up and dressing change.   Patient presents with outer dressing in place.   Dressings removed by patient.   Wound to left axilla with red moist granulating tissue. There is one small are of noted depth to left most edge. Cleansed with NS and gauze. Blotted dry. Recommending dressing changes 2 - 3 x week with purple foam and silicone border dressing.   Wound to right groin with red moist granulating tissue. Cleansed with NS and gauze. Blotted dry. Recommending dressing changes with purple foam and silicone border dressing.   Patient may consider doing dressing changes at home and only coming for weekly checks. She will let us know what she decides towards the end of the week.     Impression: Surgical wounds to left axilla and right groin    Short term goals:  Regain skin integrity. Dressing changes 2 - 3 x week.     Keshia Sosa RN    10/30/2023    09:40 EDT

## 2023-11-01 ENCOUNTER — HOSPITAL ENCOUNTER (OUTPATIENT)
Dept: INFUSION THERAPY | Facility: HOSPITAL | Age: 43
Discharge: HOME | End: 2023-11-01
Admitting: NURSE PRACTITIONER
Payer: COMMERCIAL

## 2023-11-01 VITALS
HEART RATE: 66 BPM | SYSTOLIC BLOOD PRESSURE: 144 MMHG | OXYGEN SATURATION: 100 % | RESPIRATION RATE: 20 BRPM | DIASTOLIC BLOOD PRESSURE: 87 MMHG

## 2023-11-01 DIAGNOSIS — L73.2 HIDRADENITIS SUPPURATIVA: Primary | ICD-10-CM

## 2023-11-01 PROCEDURE — G0463 HOSPITAL OUTPT CLINIC VISIT: HCPCS

## 2023-11-03 ENCOUNTER — HOSPITAL ENCOUNTER (OUTPATIENT)
Dept: INFUSION THERAPY | Facility: HOSPITAL | Age: 43
Discharge: HOME | End: 2023-11-03
Payer: COMMERCIAL

## 2023-11-03 VITALS
SYSTOLIC BLOOD PRESSURE: 144 MMHG | HEART RATE: 79 BPM | RESPIRATION RATE: 20 BRPM | TEMPERATURE: 98.1 F | OXYGEN SATURATION: 97 % | DIASTOLIC BLOOD PRESSURE: 92 MMHG

## 2023-11-03 DIAGNOSIS — L73.2 HIDRADENITIS SUPPURATIVA: Primary | ICD-10-CM

## 2023-11-03 PROCEDURE — G0463 HOSPITAL OUTPT CLINIC VISIT: HCPCS

## 2023-11-03 NOTE — SIGNIFICANT NOTE
Wound Eval / Progress Noted    [facility]     Patient Name: Pattie Antoine  : 1980  MRN: [id number]  Today's Date: 11/3/2023                 Admit Date: 11/3/2023    Visit Dx:    ICD-10-CM ICD-9-CM   1. Hidradenitis suppurativa  L73.2 705.83         * No active hospital problems. *        Past Medical History:   Diagnosis Date    Allergies     Condition not found     Mass    Hidradenitis     Right groin wound         Past Surgical History:   Procedure Laterality Date    ABDOMINOPLASTY  2019    With flank liposuction     SECTION      HIDRADENITIS EXCISION      Drainage    INCISION AND DRAINAGE ABSCESS      INCISION AND DRAINAGE OF WOUND Bilateral 2023    Procedure: EXCISION OF HYDRAADENITIS RIGHT GROIN AND LEFT AXILLA;  Surgeon: Young Sanchez MD;  [place] [facility] [place] [facility] [place]  Service: General;  Laterality: Bilateral;    PILONIDAL CYSTECTOMY           Physical Assessment:  Wound 23 Right anterior groin Incision (Active)   Wound Image   23 0940   Dressing Appearance intact 2340   Closure None 23 0940   Base moist;red;granulating 2340   Red (%), Wound Tissue Color 100 23 0940   Periwound intact 2340   Periwound Temperature warm 2340   Periwound Skin Turgor soft 2340   Edges open 2340   Wound Length (cm) 6.7 cm 2340   Wound Width (cm) 0.7 cm 2340   Wound Depth (cm) 0.1 cm 23 0940   Wound Surface Area (cm^2) 4.69 cm^2 23 0940   Wound Volume (cm^3) 0.469 cm^3 23 0940   Drainage Characteristics/Odor serosanguineous 2340   Drainage Amount small 23 0940   Care, Wound cleansed with;sterile normal saline 23 0940   Dressing Care dressing applied;dressing moistened;border dressing;silicone;foam 2340   Periwound Care absorptive dressing applied 2340       Wound 23 Left axilla Incision (Active)   Wound Image   23 0940   Dressing Appearance  intact 11/03/23 0940   Closure None 11/03/23 0940   Base moist;red;granulating 11/03/23 0940   Red (%), Wound Tissue Color 100 11/03/23 0940   Periwound intact 11/03/23 0940   Periwound Temperature warm 11/03/23 0940   Periwound Skin Turgor soft 11/03/23 0940   Edges open 11/03/23 0940   Wound Length (cm) 0.5 cm 11/03/23 0940   Wound Width (cm) 2.9 cm 11/03/23 0940   Wound Depth (cm) 0.2 cm 11/03/23 0940   Wound Surface Area (cm^2) 1.45 cm^2 11/03/23 0940   Wound Volume (cm^3) 0.29 cm^3 11/03/23 0940   Drainage Characteristics/Odor serosanguineous 11/03/23 0940   Drainage Amount small 11/03/23 0940   Care, Wound cleansed with;sterile normal saline 11/03/23 0940   Dressing Care dressing applied;dressing moistened;foam;border dressing;silicone 11/03/23 0940   Periwound Care absorptive dressing applied 11/03/23 0940        Wound Check / Follow-up:  Patient seen today for wound follow-up / wound dressing change. Patient with gauze dressings that she placed after showering.   Wound base to left axilla is red and moist with granulation tissue noted. There is intermittent wound closure between the left outermost edge and the remainder of the wound. Minimizing depth noted to left outermost edge. Cleansed wound with NS and gauze and redressed per current order.   Right groin wound with red moist granulating tissue. Wound margins are jamia. Cleansed with NS and gauze and redressed per current order.   Patient would like to do dressings at home and follow-up weekly if needed. She has an appointment with General Surgery next week for a check.   Will message General Surgery to discuss plan and discuss any further recommendations or changes. Patient is agreeable.     Impression: Surgical wounds with closure by secondary intent with good response    Short term goals:  Regain skin integrity. Dressing changes MWF. Patient to perform in absence of wound nurse.     Keshia Sosa RN    11/3/2023    11:44 EDT

## 2023-11-10 ENCOUNTER — OFFICE VISIT (OUTPATIENT)
Dept: SURGERY | Facility: CLINIC | Age: 43
End: 2023-11-10
Payer: COMMERCIAL

## 2023-11-10 VITALS
DIASTOLIC BLOOD PRESSURE: 87 MMHG | SYSTOLIC BLOOD PRESSURE: 140 MMHG | HEART RATE: 77 BPM | BODY MASS INDEX: 32.65 KG/M2 | HEIGHT: 65 IN | WEIGHT: 196 LBS

## 2023-11-10 DIAGNOSIS — L73.2 HIDRADENITIS SUPPURATIVA: Primary | ICD-10-CM

## 2023-11-10 PROCEDURE — 99024 POSTOP FOLLOW-UP VISIT: CPT | Performed by: SURGERY

## 2023-11-10 NOTE — PROGRESS NOTES
Chief Complaint  Follow-up    Subjective          Pattie Antoine presents to [facility] GENERAL SURGERY  History of Present Illness    Pattie Antoine is a 43 y.o. female  who presents today for a postoperative visit.     Patient is here for a follow-up after excision of left axillary and right groin hidradenitis.  All in all she is doing quite a bit better.  The left axillary wound has almost completely closed.  The right groin wound is also made quite a bit of progress.  I saw some pictures from just a few days ago at a dressing change and it looked really good.    Past History:  Medical History: has a past medical history of Allergies, Condition not found, Hidradenitis, and Right groin wound.   Surgical History: has a past surgical history that includes Abdominoplasty (2019);  section; Hidradenitis Excision; Incision and Drainage Abscess; Pilonidal Cystectomy; and Incision and drainage of wound (Bilateral, 2023).   Family History: family history includes Diabetes in an other family member; Heart disease in an other family member; Kidney nephrosis in her father and maternal grandmother.   Social History: reports that she has never smoked. She has never used smokeless tobacco. She reports that she does not drink alcohol and does not use drugs.  Allergies: Sulfamethoxazole-trimethoprim       Current Outpatient Medications:     Adalimumab (Humira Pen) 40 MG/0.8ML Pen-injector Kit, Inject  under the skin into the appropriate area as directed Every 14 (Fourteen) Days. LAST DOSE 9-10-23, Disp: , Rfl:     Ashlyna 0.15-0.03 &0.01 MG, Take 1 tablet by mouth Daily., Disp: , Rfl:     HYDROcodone-acetaminophen (NORCO) 5-325 MG per tablet, Take 1 tablet by mouth Every 6 (Six) Hours As Needed for Moderate Pain (Pain). (Patient not taking: Reported on 2023), Disp: 18 tablet, Rfl: 0    minocycline (MINOCIN,DYNACIN) 100 MG capsule, Take 1 capsule by mouth Daily As Needed. TAKE FOR HA ,  "NOT CURRENTLY TAKING MED (Patient not taking: Reported on 9/29/2023), Disp: , Rfl:        Physical Exam  She appears well today.  We looked at her left axillary wound and that is all but closed.  Objective     Vital Signs:   /87 (BP Location: Right arm)   Pulse 77   Ht 165.1 cm (65\")   Wt 88.9 kg (196 lb)   BMI 32.62 kg/m²              Assessment and Plan    Diagnoses and all orders for this visit:    1. Hidradenitis suppurativa (Primary)    He is going to get a hold of the wound care nurses and she has a couple of questions for them in terms of transitioning to regular dressings.  I will see her back in 4 weeks.      "

## 2023-11-13 ENCOUNTER — HOSPITAL ENCOUNTER (OUTPATIENT)
Dept: INFUSION THERAPY | Facility: HOSPITAL | Age: 43
Discharge: HOME | End: 2023-11-13
Admitting: NURSE PRACTITIONER
Payer: COMMERCIAL

## 2023-11-13 VITALS
DIASTOLIC BLOOD PRESSURE: 88 MMHG | RESPIRATION RATE: 20 BRPM | HEART RATE: 74 BPM | SYSTOLIC BLOOD PRESSURE: 144 MMHG | OXYGEN SATURATION: 100 % | TEMPERATURE: 98.4 F

## 2023-11-13 DIAGNOSIS — L73.2 HIDRADENITIS SUPPURATIVA: Primary | ICD-10-CM

## 2023-11-13 PROCEDURE — G0463 HOSPITAL OUTPT CLINIC VISIT: HCPCS

## 2023-11-13 NOTE — SIGNIFICANT NOTE
Wound Eval / Progress Noted    [facility]     Patient Name: Pattie Antoine  : 1980  MRN: [id number]  Today's Date: 2023                 Admit Date: 2023    Visit Dx:  No diagnosis found.      * No active hospital problems. *        Past Medical History:   Diagnosis Date    Allergies     Condition not found     Mass    Hidradenitis     Right groin wound         Past Surgical History:   Procedure Laterality Date    ABDOMINOPLASTY  2019    With flank liposuction     SECTION      HIDRADENITIS EXCISION      Drainage    INCISION AND DRAINAGE ABSCESS      INCISION AND DRAINAGE OF WOUND Bilateral 2023    Procedure: EXCISION OF HYDRAADENITIS RIGHT GROIN AND LEFT AXILLA;  Surgeon: Young Sanchez MD;  [place] [facility] [place] [facility] [place]  Service: General;  Laterality: Bilateral;    PILONIDAL CYSTECTOMY           Physical Assessment:  Wound 23 Right anterior groin Incision (Active)   Wound Image   23 1130   Dressing Appearance open to air 23 1130   Closure None 23 1130   Base moist;red;epithelialization 23 1130   Red (%), Wound Tissue Color 100 23 1130   Periwound intact;dry;pink 23 1130   Periwound Temperature warm 23 1130   Periwound Skin Turgor soft 23 1130   Drainage Characteristics/Odor serosanguineous 23 1130   Drainage Amount scant 23 1130   Care, Wound cleansed with;sterile normal saline 23 1130   Dressing Care dressing applied;foam;silicone;border dressing;abdominal pad 23 1130   Periwound Care absorptive dressing applied 23 1130       Wound 23 Left axilla Incision (Active)   Wound Image   23 1130   Dressing Appearance open to air 23 1130   Closure None 23 1130   Base pink;moist 23 1130   Periwound intact;dry 23 1130   Periwound Temperature warm 23 1130   Periwound Skin Turgor soft 23 1130   Edges rolled/closed 23 1130   Drainage Amount none 23  1130   Care, Wound cleansed with;sterile normal saline 11/13/23 1130   Dressing Care dressing applied;foam;silicone;border dressing 11/13/23 1130   Periwound Care absorptive dressing applied 11/13/23 1130      Wound Check / Follow-up:  Patient seen today for a wound check. Bilateral wounds open to air. Left axilla with a small open area present; wound base is pink and moist with some pink epithelium present to the periwound. Cleansed with NS. Recommending to continue dressing changes with the purple foam until the wound has full approximation.  Left groin with red moist wound bases; scarring / pink epithelium present. Wound edges are jamia and wound base is filling in but recommending to continue every other day dressing changes with the purple foam. Discussed with patient; will return next Monday for a wound check.    Impression: chronic wounds with delayed wound healing; wound improving    Short term goals:  regain skin integrity, skin protection, moisture management.    Antoinette Casey RN    11/13/2023    11:35 EST

## 2023-11-20 ENCOUNTER — HOSPITAL ENCOUNTER (OUTPATIENT)
Dept: INFUSION THERAPY | Facility: HOSPITAL | Age: 43
Discharge: HOME | End: 2023-11-20
Admitting: NURSE PRACTITIONER
Payer: COMMERCIAL

## 2023-11-20 VITALS
TEMPERATURE: 98.1 F | RESPIRATION RATE: 20 BRPM | HEART RATE: 74 BPM | DIASTOLIC BLOOD PRESSURE: 94 MMHG | OXYGEN SATURATION: 100 % | SYSTOLIC BLOOD PRESSURE: 150 MMHG

## 2023-11-20 DIAGNOSIS — Z51.89 VISIT FOR WOUND CHECK: Primary | ICD-10-CM

## 2023-11-20 PROCEDURE — G0463 HOSPITAL OUTPT CLINIC VISIT: HCPCS

## 2023-11-20 NOTE — SIGNIFICANT NOTE
Wound Eval / Progress Noted     [facility]     Patient Name: Pattie Antoine  : 1980  MRN: [id number]  Today's Date: 2023                 Admit Date: 2023    Visit Dx:  No diagnosis found.      * No active hospital problems. *        Past Medical History:   Diagnosis Date    Allergies     Condition not found     Mass    Hidradenitis     Right groin wound         Past Surgical History:   Procedure Laterality Date    ABDOMINOPLASTY  2019    With flank liposuction     SECTION      HIDRADENITIS EXCISION      Drainage    INCISION AND DRAINAGE ABSCESS      INCISION AND DRAINAGE OF WOUND Bilateral 2023    Procedure: EXCISION OF HYDRAADENITIS RIGHT GROIN AND LEFT AXILLA;  Surgeon: Young Sanchez MD;  [place] [facility] [place] [facility] [place]  Service: General;  Laterality: Bilateral;    PILONIDAL CYSTECTOMY           Physical Assessment:  Wound 23 Right anterior groin Incision (Active)   Wound Image   23 1140   Dressing Appearance open to air 23 1140   Closure None 23 1140   Base red;moist;epithelialization 23 1140   Periwound intact;dry;pink 23 1140   Periwound Temperature warm 23 1140   Periwound Skin Turgor soft 23 1140   Edges open;rolled/closed 23 1140   Drainage Characteristics/Odor serosanguineous 23 1140   Drainage Amount scant 23 1140   Care, Wound cleansed with;sterile normal saline 23 1140   Dressing Care dressing applied;foam;silicone;border dressing 23 1140   Periwound Care absorptive dressing applied 23 1140       Wound 23 Left axilla Incision (Active)   Wound Image   23 1140   Dressing Appearance open to air 23 1140   Closure None 23 1140   Base red;moist;epithelialization 23 1140   Red (%), Wound Tissue Color 100 23 1140   Periwound intact;dry 23 1140   Periwound Temperature warm 23 1140   Periwound Skin Turgor soft 23 1140   Edges open 23  1140   Drainage Characteristics/Odor serosanguineous 11/20/23 1140   Drainage Amount scant 11/20/23 1140   Care, Wound cleansed with;sterile normal saline 11/20/23 1140   Dressing Care dressing applied;foam;silicone;border dressing 11/20/23 1140   Periwound Care absorptive dressing applied 11/20/23 1140        Wound Check / Follow-up:  Patient seen today for a wound check. Bilateral wounds open to air. Left axilla with a small open area present; wound base is pink and moist with some pink epithelium present to the periwound.   Left groin with red moist wound bases; scarring / pink epithelium present. Wound edges are jamia and wound base is filling in but recommending to continue every other day dressing changes with the purple foam until full closure is reached.   Patient to return if any further needs arise.     Impression:  surgical incision with secondary closure    Short term goals:  regain skin integrity, skin protection, every other day dressing changes    Antoinette Casey RN    11/20/2023    16:03 EST

## 2023-12-01 ENCOUNTER — OFFICE VISIT (OUTPATIENT)
Dept: FAMILY MEDICINE CLINIC | Facility: CLINIC | Age: 43
End: 2023-12-01
Payer: COMMERCIAL

## 2023-12-01 VITALS
DIASTOLIC BLOOD PRESSURE: 96 MMHG | OXYGEN SATURATION: 99 % | BODY MASS INDEX: 32.65 KG/M2 | HEART RATE: 77 BPM | TEMPERATURE: 98 F | SYSTOLIC BLOOD PRESSURE: 146 MMHG | WEIGHT: 196 LBS | HEIGHT: 65 IN

## 2023-12-01 DIAGNOSIS — Z12.31 SCREENING MAMMOGRAM FOR BREAST CANCER: ICD-10-CM

## 2023-12-01 DIAGNOSIS — I10 PRIMARY HYPERTENSION: ICD-10-CM

## 2023-12-01 DIAGNOSIS — E66.09 CLASS 1 OBESITY DUE TO EXCESS CALORIES WITH SERIOUS COMORBIDITY AND BODY MASS INDEX (BMI) OF 32.0 TO 32.9 IN ADULT: ICD-10-CM

## 2023-12-01 DIAGNOSIS — E78.2 MIXED HYPERLIPIDEMIA: ICD-10-CM

## 2023-12-01 DIAGNOSIS — Z11.59 NEED FOR HEPATITIS C SCREENING TEST: ICD-10-CM

## 2023-12-01 DIAGNOSIS — L73.2 HIDRADENITIS SUPPURATIVA: ICD-10-CM

## 2023-12-01 DIAGNOSIS — D64.9 ANEMIA, UNSPECIFIED TYPE: ICD-10-CM

## 2023-12-01 DIAGNOSIS — Z13.29 THYROID DISORDER SCREENING: ICD-10-CM

## 2023-12-01 PROBLEM — E66.811 CLASS 1 OBESITY DUE TO EXCESS CALORIES WITH SERIOUS COMORBIDITY AND BODY MASS INDEX (BMI) OF 32.0 TO 32.9 IN ADULT: Status: ACTIVE | Noted: 2023-12-01

## 2023-12-01 LAB
ALBUMIN SERPL-MCNC: 4.2 G/DL (ref 3.5–5.2)
ALBUMIN/GLOB SERPL: 1 G/DL
ALP SERPL-CCNC: 53 U/L (ref 39–117)
ALT SERPL W P-5'-P-CCNC: 18 U/L (ref 1–33)
ANION GAP SERPL CALCULATED.3IONS-SCNC: 11 MMOL/L (ref 5–15)
AST SERPL-CCNC: 19 U/L (ref 1–32)
BASOPHILS # BLD AUTO: 0.04 10*3/MM3 (ref 0–0.2)
BASOPHILS NFR BLD AUTO: 0.6 % (ref 0–1.5)
BILIRUB SERPL-MCNC: <0.2 MG/DL (ref 0–1.2)
BUN SERPL-MCNC: 10 MG/DL (ref 6–20)
BUN/CREAT SERPL: 9.9 (ref 7–25)
CALCIUM SPEC-SCNC: 9.4 MG/DL (ref 8.6–10.5)
CHLORIDE SERPL-SCNC: 105 MMOL/L (ref 98–107)
CHOLEST SERPL-MCNC: 191 MG/DL (ref 0–200)
CO2 SERPL-SCNC: 22 MMOL/L (ref 22–29)
CREAT SERPL-MCNC: 1.01 MG/DL (ref 0.57–1)
DEPRECATED RDW RBC AUTO: 40 FL (ref 37–54)
EGFRCR SERPLBLD CKD-EPI 2021: 71.9 ML/MIN/1.73
EOSINOPHIL # BLD AUTO: 0.21 10*3/MM3 (ref 0–0.4)
EOSINOPHIL NFR BLD AUTO: 2.9 % (ref 0.3–6.2)
ERYTHROCYTE [DISTWIDTH] IN BLOOD BY AUTOMATED COUNT: 13 % (ref 12.3–15.4)
FERRITIN SERPL-MCNC: 22.3 NG/ML (ref 13–150)
GLOBULIN UR ELPH-MCNC: 4.3 GM/DL
GLUCOSE SERPL-MCNC: 107 MG/DL (ref 65–99)
HCT VFR BLD AUTO: 34.4 % (ref 34–46.6)
HCV AB SER DONR QL: NORMAL
HDLC SERPL-MCNC: 37 MG/DL (ref 40–60)
HGB BLD-MCNC: 11.2 G/DL (ref 12–15.9)
IMM GRANULOCYTES # BLD AUTO: 0.02 10*3/MM3 (ref 0–0.05)
IMM GRANULOCYTES NFR BLD AUTO: 0.3 % (ref 0–0.5)
IRON 24H UR-MRATE: 46 MCG/DL (ref 37–145)
IRON SATN MFR SERPL: 11 % (ref 20–50)
LDLC SERPL CALC-MCNC: 140 MG/DL (ref 0–100)
LDLC/HDLC SERPL: 3.75 {RATIO}
LYMPHOCYTES # BLD AUTO: 3.08 10*3/MM3 (ref 0.7–3.1)
LYMPHOCYTES NFR BLD AUTO: 43.1 % (ref 19.6–45.3)
MCH RBC QN AUTO: 27.4 PG (ref 26.6–33)
MCHC RBC AUTO-ENTMCNC: 32.6 G/DL (ref 31.5–35.7)
MCV RBC AUTO: 84.1 FL (ref 79–97)
MONOCYTES # BLD AUTO: 0.49 10*3/MM3 (ref 0.1–0.9)
MONOCYTES NFR BLD AUTO: 6.9 % (ref 5–12)
NEUTROPHILS NFR BLD AUTO: 3.31 10*3/MM3 (ref 1.7–7)
NEUTROPHILS NFR BLD AUTO: 46.2 % (ref 42.7–76)
NRBC BLD AUTO-RTO: 0 /100 WBC (ref 0–0.2)
PLATELET # BLD AUTO: 416 10*3/MM3 (ref 140–450)
PMV BLD AUTO: 10.4 FL (ref 6–12)
POTASSIUM SERPL-SCNC: 3.9 MMOL/L (ref 3.5–5.2)
PROT SERPL-MCNC: 8.5 G/DL (ref 6–8.5)
RBC # BLD AUTO: 4.09 10*6/MM3 (ref 3.77–5.28)
SODIUM SERPL-SCNC: 138 MMOL/L (ref 136–145)
T4 FREE SERPL-MCNC: 1.17 NG/DL (ref 0.93–1.7)
TIBC SERPL-MCNC: 417 MCG/DL (ref 298–536)
TRANSFERRIN SERPL-MCNC: 280 MG/DL (ref 200–360)
TRIGL SERPL-MCNC: 77 MG/DL (ref 0–150)
TSH SERPL DL<=0.05 MIU/L-ACNC: 0.82 UIU/ML (ref 0.27–4.2)
VIT B12 BLD-MCNC: 606 PG/ML (ref 211–946)
VLDLC SERPL-MCNC: 14 MG/DL (ref 5–40)
WBC NRBC COR # BLD AUTO: 7.15 10*3/MM3 (ref 3.4–10.8)

## 2023-12-01 PROCEDURE — 82607 VITAMIN B-12: CPT | Performed by: NURSE PRACTITIONER

## 2023-12-01 PROCEDURE — 83540 ASSAY OF IRON: CPT | Performed by: NURSE PRACTITIONER

## 2023-12-01 PROCEDURE — 84466 ASSAY OF TRANSFERRIN: CPT | Performed by: NURSE PRACTITIONER

## 2023-12-01 PROCEDURE — 80061 LIPID PANEL: CPT | Performed by: NURSE PRACTITIONER

## 2023-12-01 PROCEDURE — 86803 HEPATITIS C AB TEST: CPT | Performed by: NURSE PRACTITIONER

## 2023-12-01 PROCEDURE — 84439 ASSAY OF FREE THYROXINE: CPT | Performed by: NURSE PRACTITIONER

## 2023-12-01 PROCEDURE — 82728 ASSAY OF FERRITIN: CPT | Performed by: NURSE PRACTITIONER

## 2023-12-01 PROCEDURE — 80050 GENERAL HEALTH PANEL: CPT | Performed by: NURSE PRACTITIONER

## 2023-12-01 RX ORDER — LISINOPRIL AND HYDROCHLOROTHIAZIDE 12.5; 1 MG/1; MG/1
1 TABLET ORAL DAILY
Qty: 30 TABLET | Refills: 1 | Status: SHIPPED | OUTPATIENT
Start: 2023-12-01

## 2023-12-01 RX ORDER — SEMAGLUTIDE 0.25 MG/.5ML
0.25 INJECTION, SOLUTION SUBCUTANEOUS WEEKLY
Qty: 2 ML | Refills: 0 | Status: SHIPPED | OUTPATIENT
Start: 2023-12-01 | End: 2023-12-04 | Stop reason: SDUPTHER

## 2023-12-01 NOTE — PROGRESS NOTES
New Patient Office Visit      Patient Name: Pattie Antoine  : 1980   MRN: [id number]     Chief Complaint:    Chief Complaint   Patient presents with    Anemia    Hyperlipidemia    Hypertension    Establish Care       History of Present Illness: Pattie Antoine is a 43 y.o. female who is here today to establish care and continuation of care for anemia, hyperlipidemia hypertension    Last pcp- Mari Conn- office quit taking her insurance.  Specialists- Dr. Sanchez, dr blair     Med hx- anemia, hyperlipidemia, allergies, hidradenitis suppurativa multiple surgeries   Sx hx-I/D wound 2023, abominoplasty- ,  section, pilonidal cystectomy  Fam med hx- father- kidney nephrosis, HTN     Mammogram-2022  Pap- 2022- Dr Travon wilkes  Lab-2023    C/o follow up elevated blood pressure readings. She says she had a 3 day long headache over Thanksgiving.  Exercising   She wants to discuss weight loss options.    Subjective      Review of Systems:   Review of Systems   Constitutional:  Negative for fever.   HENT:  Negative for ear pain and sore throat.    Respiratory:  Negative for cough and shortness of breath.    Cardiovascular:  Negative for chest pain, palpitations and leg swelling.   Gastrointestinal:  Negative for abdominal pain, diarrhea, nausea and vomiting.   Musculoskeletal:  Negative for myalgias.   Neurological:  Positive for headaches. Negative for dizziness.        Past Medical History:   Past Medical History:   Diagnosis Date    Allergic     Allergies     Anemia     Condition not found     Mass    Hidradenitis     Hyperlipidemia     Right groin wound        Past Surgical History:   Past Surgical History:   Procedure Laterality Date    ABDOMINOPLASTY  2019    With flank liposuction     SECTION      HIDRADENITIS EXCISION      Drainage    INCISION AND DRAINAGE ABSCESS      INCISION AND DRAINAGE OF WOUND Bilateral 2023    Procedure: EXCISION OF HYDRAADENITIS RIGHT  "GROIN AND LEFT AXILLA;  Surgeon: Young Sanchez MD;  [place] [facility] [place] [facility] [place]  Service: General;  Laterality: Bilateral;    PILONIDAL CYSTECTOMY         Family History:   Family History   Problem Relation Age of Onset    Kidney nephrosis Father     Kidney nephrosis Maternal Grandmother     Diabetes Other     Heart disease Other     Malig Hyperthermia Neg Hx        Social History:   Social History     Socioeconomic History    Marital status:    Tobacco Use    Smoking status: Never     Passive exposure: Never    Smokeless tobacco: Never   Vaping Use    Vaping Use: Never used   Substance and Sexual Activity    Alcohol use: Never    Drug use: Never    Sexual activity: Defer       Medications:     Current Outpatient Medications:     Adalimumab (Humira Pen) 40 MG/0.8ML Pen-injector Kit, Inject  under the skin into the appropriate area as directed Every 14 (Fourteen) Days. LAST DOSE 9-10-23, Disp: , Rfl:     Ashlyna 0.15-0.03 &0.01 MG, Take 1 tablet by mouth Daily., Disp: , Rfl:     lisinopril-hydrochlorothiazide (PRINZIDE,ZESTORETIC) 10-12.5 MG per tablet, Take 1 tablet by mouth Daily., Disp: 30 tablet, Rfl: 1    Semaglutide-Weight Management (Wegovy) 0.25 MG/0.5ML solution auto-injector, Inject 0.25 mg under the skin into the appropriate area as directed 1 (One) Time Per Week., Disp: 2 mL, Rfl: 0    Allergies:   Allergies   Allergen Reactions    Sulfamethoxazole-Trimethoprim Hives       Objective     Physical Exam:  Vital Signs:   Vitals:    12/01/23 0941   BP: 146/96   Pulse: 77   Temp: 98 °F (36.7 °C)   SpO2: 99%   Weight: 88.9 kg (196 lb)   Height: 165.1 cm (65\")     Body mass index is 32.62 kg/m².     Physical Exam  HENT:      Right Ear: Tympanic membrane normal.      Left Ear: Tympanic membrane normal.      Nose: Nose normal.      Mouth/Throat:      Mouth: Mucous membranes are moist.   Eyes:      Conjunctiva/sclera: Conjunctivae normal.   Neck:      Vascular: No carotid bruit.   Cardiovascular:      Rate " and Rhythm: Normal rate and regular rhythm.      Heart sounds: Normal heart sounds. No murmur heard.  Pulmonary:      Effort: Pulmonary effort is normal.      Breath sounds: Normal breath sounds.   Abdominal:      General: Bowel sounds are normal.      Palpations: Abdomen is soft.   Musculoskeletal:      Right lower leg: No edema.      Left lower leg: No edema.   Skin:     General: Skin is warm and dry.   Neurological:      Mental Status: She is alert.   Psychiatric:         Mood and Affect: Mood normal.         Behavior: Behavior normal.             Assessment / Plan      Assessment/Plan:   Diagnoses and all orders for this visit:    1. Primary hypertension    2. Mixed hyperlipidemia  -     Comprehensive Metabolic Panel  -     Lipid Panel    3. Class 1 obesity due to excess calories with serious comorbidity and body mass index (BMI) of 32.0 to 32.9 in adult    4. Anemia, unspecified type  -     CBC Auto Differential  -     Vitamin B12  -     Iron Profile  -     Ferritin    5. Hidradenitis suppurativa    6. Thyroid disorder screening  -     TSH  -     T4, Free    7. Need for hepatitis C screening test  -     Hepatitis C antibody; Future  -     Hepatitis C antibody    8. Screening mammogram for breast cancer  -     Mammo Screening Digital Tomosynthesis Bilateral With CAD; Future    Other orders  -     lisinopril-hydrochlorothiazide (PRINZIDE,ZESTORETIC) 10-12.5 MG per tablet; Take 1 tablet by mouth Daily.  Dispense: 30 tablet; Refill: 1  -     Semaglutide-Weight Management (Wegovy) 0.25 MG/0.5ML solution auto-injector; Inject 0.25 mg under the skin into the appropriate area as directed 1 (One) Time Per Week.  Dispense: 2 mL; Refill: 0         Hypertension uncontrolled we will start lisinopril-hydrochlorothiazide follow-up in 4 weeks decrease salt intake  Hyperlipidemia will obtain lipid panel and monitor recommend reduce fried foods red meat pork products cheese increase fruits vegetables whole grains exercise 30  "minutes daily weight loss  Class I obesity with serious comorbidity of hypertension hyperlipidemia will start Wegovy recommend reduce overall caloric intake increase water intake exercise 30 minutes daily  Anemia we will obtain labs to monitor no current supplementation  Hydradenitis suppurative currently managed by neurosurgery Dr. Sanchez and dermatology  prescribing Humira  Will obtain TSH T4 to screen for thyroid disorder  Will provide out order for screening mammogram denies lumps mass tenderness blood or discharge from nipple and obtain Pap smear results from previous PCP      Follow Up:   Return in about 4 weeks (around 12/29/2023).    Davi Hirsch, APRN      \"Please note that portions of this note were completed with a voice recognition program.\"    "

## 2023-12-04 ENCOUNTER — TELEPHONE (OUTPATIENT)
Dept: FAMILY MEDICINE CLINIC | Facility: CLINIC | Age: 43
End: 2023-12-04
Payer: COMMERCIAL

## 2023-12-04 DIAGNOSIS — E66.09 CLASS 1 OBESITY DUE TO EXCESS CALORIES WITH SERIOUS COMORBIDITY AND BODY MASS INDEX (BMI) OF 32.0 TO 32.9 IN ADULT: Primary | ICD-10-CM

## 2023-12-04 RX ORDER — SEMAGLUTIDE 0.25 MG/.5ML
0.25 INJECTION, SOLUTION SUBCUTANEOUS WEEKLY
Qty: 2 ML | Refills: 0 | Status: SHIPPED | OUTPATIENT
Start: 2023-12-04 | End: 2023-12-08 | Stop reason: RX

## 2023-12-04 NOTE — TELEPHONE ENCOUNTER
Caller: Pattie Antoine    Relationship to patient: Self    Best call back number: [phone]    Patient is needing: PATIENT CALLED IN AND SAID SHE WOULD LIKE HER PRESCRIPTION FOR     Semaglutide-Weight Management (Wegovy) 0.25 MG/0.5ML solution auto-injector   TO BE TRANSFERRED TO   [id number] - [place], [place] - [address] AT [facility] OF [address] [facility] & [place] - [phone]  - [phone]     PATIENT SAID IT IS OKAY TO LEAVE MESSAGE ON PHONE IF NEEDED

## 2023-12-08 ENCOUNTER — OFFICE VISIT (OUTPATIENT)
Dept: SURGERY | Facility: CLINIC | Age: 43
End: 2023-12-08
Payer: COMMERCIAL

## 2023-12-08 VITALS
HEART RATE: 65 BPM | DIASTOLIC BLOOD PRESSURE: 47 MMHG | SYSTOLIC BLOOD PRESSURE: 118 MMHG | BODY MASS INDEX: 32.55 KG/M2 | WEIGHT: 195.4 LBS | HEIGHT: 65 IN

## 2023-12-08 DIAGNOSIS — L73.2 HIDRADENITIS SUPPURATIVA: Primary | ICD-10-CM

## 2023-12-08 DIAGNOSIS — E66.09 CLASS 1 OBESITY DUE TO EXCESS CALORIES WITH SERIOUS COMORBIDITY AND BODY MASS INDEX (BMI) OF 32.0 TO 32.9 IN ADULT: Primary | ICD-10-CM

## 2023-12-08 PROCEDURE — 99024 POSTOP FOLLOW-UP VISIT: CPT | Performed by: SURGERY

## 2023-12-08 NOTE — LETTER
2023     DOMINGO Meyer  [address] [place] [address]    Patient: Pattie Antoine   YOB: 1980   Date of Visit: 2023     Dear DOMINGO Meyer:       Thank you for referring Pattie Antoine to me for evaluation. Below are the relevant portions of my assessment and plan of care.    If you have questions, please do not hesitate to call me. I look forward to following Pattie along with you.         Sincerely,        Young Sanchez MD        CC: No Recipients    Young Sanchez MD  23 1140  Sign when Signing Visit  Chief Complaint  Hidradenitis suppurativa f/up (1 month )    Subjective         Pattie Antoine presents to [facility] GENERAL SURGERY  History of Present Illness    Pattie Antoine is a 43 y.o. female  who presents today for a postoperative visit.     Patient is here for a follow-up after surgery for hidradenitis of the left axilla and right groin.  She is doing fine and had no complaints today.    Past History:  Medical History: has a past medical history of Allergic, Allergies, Anemia, Condition not found, Hidradenitis, Hyperlipidemia, and Right groin wound.   Surgical History: has a past surgical history that includes Abdominoplasty (2019);  section; Hidradenitis Excision; Incision and Drainage Abscess; Pilonidal Cystectomy; and Incision and drainage of wound (Bilateral, 2023).   Family History: family history includes Diabetes in an other family member; Heart disease in an other family member; Kidney nephrosis in her father and maternal grandmother.   Social History: reports that she has never smoked. She has never been exposed to tobacco smoke. She has never used smokeless tobacco. She reports that she does not drink alcohol and does not use drugs.  Allergies: Sulfamethoxazole-trimethoprim       Current Outpatient Medications:   •  Adalimumab (Humira Pen) 40 MG/0.8ML  "Pen-injector Kit, Inject  under the skin into the appropriate area as directed Every 14 (Fourteen) Days. LAST DOSE 9-10-23, Disp: , Rfl:   •  Ashlyna 0.15-0.03 &0.01 MG, Take 1 tablet by mouth Daily., Disp: , Rfl:   •  lisinopril-hydrochlorothiazide (PRINZIDE,ZESTORETIC) 10-12.5 MG per tablet, Take 1 tablet by mouth Daily., Disp: 30 tablet, Rfl: 1  •  Semaglutide-Weight Management (Wegovy) 0.25 MG/0.5ML solution auto-injector, Inject 0.25 mg under the skin into the appropriate area as directed 1 (One) Time Per Week. (Patient not taking: Reported on 12/8/2023), Disp: 2 mL, Rfl: 0       Physical Exam  Her wounds have completely healed at this point.  Objective    Vital Signs:   /47 (BP Location: Right arm, Patient Position: Sitting, Cuff Size: Large Adult)   Pulse 65   Ht 165.1 cm (65\")   Wt 88.6 kg (195 lb 6.4 oz)   BMI 32.52 kg/m²              Assessment and Plan    Diagnoses and all orders for this visit:    1. Hidradenitis suppurativa (Primary)    This point she is doing great.  I will see her back on an as-needed basis.      "

## 2023-12-08 NOTE — PROGRESS NOTES
Chief Complaint  Hidradenitis suppurativa f/up (1 month )    Subjective          Pattie Antoine presents to [facility] GENERAL SURGERY  History of Present Illness    Pattie Antoine is a 43 y.o. female  who presents today for a postoperative visit.     Patient is here for a follow-up after surgery for hidradenitis of the left axilla and right groin.  She is doing fine and had no complaints today.    Past History:  Medical History: has a past medical history of Allergic, Allergies, Anemia, Condition not found, Hidradenitis, Hyperlipidemia, and Right groin wound.   Surgical History: has a past surgical history that includes Abdominoplasty (2019);  section; Hidradenitis Excision; Incision and Drainage Abscess; Pilonidal Cystectomy; and Incision and drainage of wound (Bilateral, 2023).   Family History: family history includes Diabetes in an other family member; Heart disease in an other family member; Kidney nephrosis in her father and maternal grandmother.   Social History: reports that she has never smoked. She has never been exposed to tobacco smoke. She has never used smokeless tobacco. She reports that she does not drink alcohol and does not use drugs.  Allergies: Sulfamethoxazole-trimethoprim       Current Outpatient Medications:     Adalimumab (Humira Pen) 40 MG/0.8ML Pen-injector Kit, Inject  under the skin into the appropriate area as directed Every 14 (Fourteen) Days. LAST DOSE 9-10-23, Disp: , Rfl:     Ashlyna 0.15-0.03 &0.01 MG, Take 1 tablet by mouth Daily., Disp: , Rfl:     lisinopril-hydrochlorothiazide (PRINZIDE,ZESTORETIC) 10-12.5 MG per tablet, Take 1 tablet by mouth Daily., Disp: 30 tablet, Rfl: 1    Semaglutide-Weight Management (Wegovy) 0.25 MG/0.5ML solution auto-injector, Inject 0.25 mg under the skin into the appropriate area as directed 1 (One) Time Per Week. (Patient not taking: Reported on 2023), Disp: 2 mL, Rfl: 0       Physical Exam  Her wounds  "have completely healed at this point.  Objective     Vital Signs:   /47 (BP Location: Right arm, Patient Position: Sitting, Cuff Size: Large Adult)   Pulse 65   Ht 165.1 cm (65\")   Wt 88.6 kg (195 lb 6.4 oz)   BMI 32.52 kg/m²              Assessment and Plan    Diagnoses and all orders for this visit:    1. Hidradenitis suppurativa (Primary)    This point she is doing great.  I will see her back on an as-needed basis.      "

## 2023-12-14 ENCOUNTER — CLINICAL SUPPORT (OUTPATIENT)
Dept: FAMILY MEDICINE CLINIC | Facility: CLINIC | Age: 43
End: 2023-12-14
Payer: COMMERCIAL

## 2023-12-14 DIAGNOSIS — R73.01 IMPAIRED FASTING GLUCOSE: ICD-10-CM

## 2023-12-14 DIAGNOSIS — R73.01 IMPAIRED FASTING GLUCOSE: Primary | ICD-10-CM

## 2023-12-14 LAB
ALBUMIN SERPL-MCNC: 4.1 G/DL (ref 3.5–5.2)
ALBUMIN/GLOB SERPL: 0.9 G/DL
ALP SERPL-CCNC: 52 U/L (ref 39–117)
ALT SERPL W P-5'-P-CCNC: 25 U/L (ref 1–33)
ANION GAP SERPL CALCULATED.3IONS-SCNC: 13.4 MMOL/L (ref 5–15)
AST SERPL-CCNC: 22 U/L (ref 1–32)
BILIRUB SERPL-MCNC: <0.2 MG/DL (ref 0–1.2)
BUN SERPL-MCNC: 10 MG/DL (ref 6–20)
BUN/CREAT SERPL: 9.3 (ref 7–25)
CALCIUM SPEC-SCNC: 9.2 MG/DL (ref 8.6–10.5)
CHLORIDE SERPL-SCNC: 101 MMOL/L (ref 98–107)
CO2 SERPL-SCNC: 22.6 MMOL/L (ref 22–29)
CREAT SERPL-MCNC: 1.07 MG/DL (ref 0.57–1)
EGFRCR SERPLBLD CKD-EPI 2021: 67.1 ML/MIN/1.73
GLOBULIN UR ELPH-MCNC: 4.5 GM/DL
GLUCOSE SERPL-MCNC: 76 MG/DL (ref 65–99)
HBA1C MFR BLD: 5.6 % (ref 4.8–5.6)
POTASSIUM SERPL-SCNC: 4 MMOL/L (ref 3.5–5.2)
PROT SERPL-MCNC: 8.6 G/DL (ref 6–8.5)
SODIUM SERPL-SCNC: 137 MMOL/L (ref 136–145)

## 2023-12-14 PROCEDURE — 83036 HEMOGLOBIN GLYCOSYLATED A1C: CPT | Performed by: NURSE PRACTITIONER

## 2023-12-14 PROCEDURE — 80053 COMPREHEN METABOLIC PANEL: CPT | Performed by: NURSE PRACTITIONER

## 2023-12-20 DIAGNOSIS — R79.89 ELEVATED SERUM CREATININE: Primary | ICD-10-CM

## 2024-01-15 PROBLEM — N18.9 CKD (CHRONIC KIDNEY DISEASE): Status: ACTIVE | Noted: 2024-01-15

## 2024-01-17 ENCOUNTER — OFFICE VISIT (OUTPATIENT)
Dept: FAMILY MEDICINE CLINIC | Facility: CLINIC | Age: 44
End: 2024-01-17
Payer: COMMERCIAL

## 2024-01-17 ENCOUNTER — TRANSCRIBE ORDERS (OUTPATIENT)
Dept: LAB | Facility: HOSPITAL | Age: 44
End: 2024-01-17
Payer: COMMERCIAL

## 2024-01-17 ENCOUNTER — LAB (OUTPATIENT)
Dept: LAB | Facility: HOSPITAL | Age: 44
End: 2024-01-17
Payer: COMMERCIAL

## 2024-01-17 VITALS
HEIGHT: 65 IN | BODY MASS INDEX: 33.49 KG/M2 | WEIGHT: 201 LBS | DIASTOLIC BLOOD PRESSURE: 80 MMHG | SYSTOLIC BLOOD PRESSURE: 125 MMHG | TEMPERATURE: 97.4 F | OXYGEN SATURATION: 98 % | HEART RATE: 65 BPM

## 2024-01-17 DIAGNOSIS — Z13.29 SCREENING FOR THYROID DISORDER: ICD-10-CM

## 2024-01-17 DIAGNOSIS — E66.09 CLASS 1 OBESITY DUE TO EXCESS CALORIES WITH SERIOUS COMORBIDITY AND BODY MASS INDEX (BMI) OF 33.0 TO 33.9 IN ADULT: ICD-10-CM

## 2024-01-17 DIAGNOSIS — L73.2 HIDRADENITIS: ICD-10-CM

## 2024-01-17 DIAGNOSIS — D50.9 IRON DEFICIENCY ANEMIA, UNSPECIFIED IRON DEFICIENCY ANEMIA TYPE: ICD-10-CM

## 2024-01-17 DIAGNOSIS — I10 PRIMARY HYPERTENSION: Primary | ICD-10-CM

## 2024-01-17 DIAGNOSIS — I12.9 HYPERTENSIVE NEPHROPATHY: ICD-10-CM

## 2024-01-17 DIAGNOSIS — N18.1 STAGE 1 CHRONIC KIDNEY DISEASE: ICD-10-CM

## 2024-01-17 DIAGNOSIS — E78.2 MIXED HYPERLIPIDEMIA: ICD-10-CM

## 2024-01-17 DIAGNOSIS — N18.2 CHRONIC KIDNEY DISEASE, STAGE II (MILD): Primary | ICD-10-CM

## 2024-01-17 DIAGNOSIS — N18.2 CHRONIC KIDNEY DISEASE, STAGE II (MILD): ICD-10-CM

## 2024-01-17 PROBLEM — E61.1 IRON DEFICIENCY: Status: ACTIVE | Noted: 2024-01-17

## 2024-01-17 LAB
ALBUMIN SERPL-MCNC: 4.1 G/DL (ref 3.5–5.2)
ANION GAP SERPL CALCULATED.3IONS-SCNC: 14.1 MMOL/L (ref 5–15)
BACTERIA UR QL AUTO: ABNORMAL /HPF
BILIRUB UR QL STRIP: NEGATIVE
BUN SERPL-MCNC: 10 MG/DL (ref 6–20)
BUN/CREAT SERPL: 9.7 (ref 7–25)
CALCIUM SPEC-SCNC: 9.5 MG/DL (ref 8.6–10.5)
CHLORIDE SERPL-SCNC: 100 MMOL/L (ref 98–107)
CLARITY UR: CLEAR
CO2 SERPL-SCNC: 23.9 MMOL/L (ref 22–29)
COLOR UR: YELLOW
CREAT SERPL-MCNC: 1.03 MG/DL (ref 0.57–1)
EGFRCR SERPLBLD CKD-EPI 2021: 70.2 ML/MIN/1.73
GLUCOSE SERPL-MCNC: 82 MG/DL (ref 65–99)
GLUCOSE UR STRIP-MCNC: NEGATIVE MG/DL
HGB UR QL STRIP.AUTO: NEGATIVE
HYALINE CASTS UR QL AUTO: ABNORMAL /LPF
KETONES UR QL STRIP: NEGATIVE
LEUKOCYTE ESTERASE UR QL STRIP.AUTO: ABNORMAL
NITRITE UR QL STRIP: NEGATIVE
PH UR STRIP.AUTO: 6 [PH] (ref 5–8)
PHOSPHATE SERPL-MCNC: 3 MG/DL (ref 2.5–4.5)
POTASSIUM SERPL-SCNC: 4.3 MMOL/L (ref 3.5–5.2)
PROT UR QL STRIP: NEGATIVE
RBC # UR STRIP: ABNORMAL /HPF
REF LAB TEST METHOD: ABNORMAL
SODIUM SERPL-SCNC: 138 MMOL/L (ref 136–145)
SP GR UR STRIP: 1.02 (ref 1–1.03)
SQUAMOUS #/AREA URNS HPF: ABNORMAL /HPF
UROBILINOGEN UR QL STRIP: ABNORMAL
WBC # UR STRIP: ABNORMAL /HPF

## 2024-01-17 PROCEDURE — 80069 RENAL FUNCTION PANEL: CPT

## 2024-01-17 PROCEDURE — 81001 URINALYSIS AUTO W/SCOPE: CPT

## 2024-01-17 PROCEDURE — 36415 COLL VENOUS BLD VENIPUNCTURE: CPT

## 2024-01-17 RX ORDER — MULTIVIT WITH MINERALS/LUTEIN
250 TABLET ORAL DAILY
Qty: 90 TABLET | Refills: 1 | Status: SHIPPED | OUTPATIENT
Start: 2024-01-17

## 2024-01-17 RX ORDER — LANOLIN ALCOHOL/MO/W.PET/CERES
325 CREAM (GRAM) TOPICAL
Qty: 90 TABLET | Refills: 1 | Status: SHIPPED | OUTPATIENT
Start: 2024-01-17

## 2024-01-17 RX ORDER — LISINOPRIL AND HYDROCHLOROTHIAZIDE 12.5; 1 MG/1; MG/1
1 TABLET ORAL DAILY
Qty: 90 TABLET | Refills: 1 | Status: SHIPPED | OUTPATIENT
Start: 2024-01-17

## 2024-01-17 RX ORDER — SEMAGLUTIDE 0.25 MG/.5ML
0.25 INJECTION, SOLUTION SUBCUTANEOUS WEEKLY
Qty: 2 ML | Refills: 0 | Status: SHIPPED | OUTPATIENT
Start: 2024-01-17

## 2024-01-17 RX ORDER — LEVONORGESTREL AND ETHINYL ESTRADIOL AND ETHINYL ESTRADIOL 150-30(84)
1 KIT ORAL DAILY
Qty: 90 TABLET | Refills: 3 | Status: SHIPPED | OUTPATIENT
Start: 2024-01-17

## 2024-01-17 RX ORDER — ADALIMUMAB 40MG/0.4ML
KIT SUBCUTANEOUS
COMMUNITY
Start: 2023-12-28 | End: 2024-01-17

## 2024-01-17 NOTE — PROGRESS NOTES
Follow Up Office Visit      Patient Name: Pattie Antoine  : 1980   MRN: [id number]     Chief Complaint:    Chief Complaint   Patient presents with    Follow-up     4 week     Hypertension    Hyperlipidemia    Anemia    Obesity       History of Present Illness: Pattie Antoine is a 43 y.o. female who is here today to follow up for HTN, hyperlipidemia, obesity, CKD and anemia  Review lab results     Follow up new start  lisinopril-hctz.    Requests refill of birth control   PAP 2022 dr chambers     Follow up nephrology consult 1/3/2024  per progress note   Plan   - Repeat lab work in follow up , including Cystatin C renal panel, urine analysis, urine protein/ creatinine ratio .  - Renal function continues to remain stable   - Discussed risk control to prevent renal decline   -Instruction about NSAIDs avoidance, discussed   - Close follow up   - Following appointment in 4-6 weeks     Pt reports her Insurance required a weight loss management program for 3 months prior to starting wegovy    Subjective      Review of Systems:   Review of Systems   Constitutional:  Negative for fever.   Eyes:  Negative for visual disturbance.   Respiratory:  Negative for shortness of breath.    Cardiovascular:  Negative for chest pain.   Gastrointestinal:  Negative for abdominal pain, diarrhea, nausea and vomiting.   Genitourinary:  Negative for dysuria.   Musculoskeletal:  Negative for myalgias.   Neurological:  Negative for dizziness and headaches.        Past Medical History:   Past Medical History:   Diagnosis Date    Allergic     Allergies     Anemia     Condition not found     Mass    Hidradenitis     Hyperlipidemia     Right groin wound        Past Surgical History:   Past Surgical History:   Procedure Laterality Date    ABDOMINOPLASTY  2019    With flank liposuction     SECTION      HIDRADENITIS EXCISION      Drainage    INCISION AND DRAINAGE ABSCESS      INCISION AND DRAINAGE OF WOUND Bilateral  "9/14/2023    Procedure: EXCISION OF HYDRAADENITIS RIGHT GROIN AND LEFT AXILLA;  Surgeon: Young Sanchez MD;  [place] [facility] [place] [facility] [place]  Service: General;  Laterality: Bilateral;    PILONIDAL CYSTECTOMY         Family History:   Family History   Problem Relation Age of Onset    Kidney nephrosis Father     Kidney nephrosis Maternal Grandmother     Diabetes Other     Heart disease Other     Malig Hyperthermia Neg Hx        Social History:   Social History     Socioeconomic History    Marital status:    Tobacco Use    Smoking status: Never     Passive exposure: Never    Smokeless tobacco: Never   Vaping Use    Vaping Use: Never used   Substance and Sexual Activity    Alcohol use: Never    Drug use: Never    Sexual activity: Defer       Medications:     Current Outpatient Medications:     Adalimumab (Humira Pen) 40 MG/0.8ML Pen-injector Kit, Inject  under the skin into the appropriate area as directed Every 14 (Fourteen) Days. LAST DOSE 9-10-23, Disp: , Rfl:     Ashlyna 0.15-0.03 &0.01 MG, Take 1 tablet by mouth Daily., Disp: 90 tablet, Rfl: 3    lisinopril-hydrochlorothiazide (PRINZIDE,ZESTORETIC) 10-12.5 MG per tablet, Take 1 tablet by mouth Daily., Disp: 90 tablet, Rfl: 1    ferrous sulfate 325 (65 FE) MG EC tablet, Take 1 tablet by mouth Daily With Breakfast., Disp: 90 tablet, Rfl: 1    Semaglutide-Weight Management (Wegovy) 0.25 MG/0.5ML solution auto-injector, Inject 0.25 mg under the skin into the appropriate area as directed 1 (One) Time Per Week., Disp: 2 mL, Rfl: 0    vitamin C (ASCORBIC ACID) 250 MG tablet, Take 1 tablet by mouth Daily., Disp: 90 tablet, Rfl: 1    Allergies:   Allergies   Allergen Reactions    Sulfamethoxazole-Trimethoprim Hives             Objective     Physical Exam:  Vital Signs:   Vitals:    01/17/24 0828   BP: 125/80   BP Location: Left arm   Patient Position: Sitting   Pulse: 65   Temp: 97.4 °F (36.3 °C)   SpO2: 98%   Weight: 91.2 kg (201 lb)   Height: 165.1 cm (65\")     Body " mass index is 33.45 kg/m².   BMI is >= 30 and <35. (Class 1 Obesity). The following options were offered after discussion;: exercise counseling/recommendations and nutrition counseling/recommendations       Physical Exam  Neck:      Vascular: No carotid bruit.   Cardiovascular:      Rate and Rhythm: Normal rate and regular rhythm.      Heart sounds: Normal heart sounds. No murmur heard.  Pulmonary:      Effort: Pulmonary effort is normal.      Breath sounds: Normal breath sounds.   Abdominal:      General: Bowel sounds are normal.      Palpations: Abdomen is soft.   Musculoskeletal:      Right lower leg: No edema.      Left lower leg: No edema.   Skin:     General: Skin is warm and dry.   Neurological:      Mental Status: She is alert.   Psychiatric:         Mood and Affect: Mood normal.         Behavior: Behavior normal.             Assessment / Plan      Assessment/Plan:   Diagnoses and all orders for this visit:    1. Primary hypertension (Primary)    2. Mixed hyperlipidemia  -     Comprehensive Metabolic Panel; Future  -     Lipid Panel; Future    3. Stage 1 chronic kidney disease    4. Iron deficiency anemia, unspecified iron deficiency anemia type  -     CBC Auto Differential; Future  -     Iron Profile; Future  -     Ferritin; Future    5. Class 1 obesity due to excess calories with serious comorbidity and body mass index (BMI) of 33.0 to 33.9 in adult    6. Screening for thyroid disorder  -     TSH; Future    Other orders  -     lisinopril-hydrochlorothiazide (PRINZIDE,ZESTORETIC) 10-12.5 MG per tablet; Take 1 tablet by mouth Daily.  Dispense: 90 tablet; Refill: 1  -     Semaglutide-Weight Management (Wegovy) 0.25 MG/0.5ML solution auto-injector; Inject 0.25 mg under the skin into the appropriate area as directed 1 (One) Time Per Week.  Dispense: 2 mL; Refill: 0  -     Ashlyna 0.15-0.03 &0.01 MG; Take 1 tablet by mouth Daily.  Dispense: 90 tablet; Refill: 3  -     ferrous sulfate 325 (65 FE) MG EC tablet;  "Take 1 tablet by mouth Daily With Breakfast.  Dispense: 90 tablet; Refill: 1  -     vitamin C (ASCORBIC ACID) 250 MG tablet; Take 1 tablet by mouth Daily.  Dispense: 90 tablet; Refill: 1       Hypertension currently controlled lisinopril-hydrochlorothiazide will provide refill  Hyperlipidemia recommend reducing fried foods red meat pork products cheese increasing fruits vegetables whole grains exercise 30 minutes daily weight loss  Stage I chronic kidney disease avoid all NSAIDs increase water intake and good blood pressure control  Iron deficiency anemia we will start ferrous sulfate and vitamin C 1 tablet daily recommend increasing green leafy vegetables in diet we will reassess at follow-up in 9 days  Class I obesity with serious comorbidity of hypertension hyperlipidemia recommend reduce overall caloric intake will start Wegovy once patient has completed required weight loss program through her insurance in 90 days recommend reducing overall caloric intake increase water intake and exercise 30 minutes daily      Follow Up:   Return in about 3 months (around 4/17/2024).    DOMINGO Bob    \"Please note that portions of this note were completed with a voice recognition program.\"    "

## 2024-01-18 ENCOUNTER — HOSPITAL ENCOUNTER (OUTPATIENT)
Dept: MAMMOGRAPHY | Facility: HOSPITAL | Age: 44
Discharge: HOME | End: 2024-01-18
Admitting: NURSE PRACTITIONER
Payer: COMMERCIAL

## 2024-01-18 DIAGNOSIS — Z12.31 SCREENING MAMMOGRAM FOR BREAST CANCER: ICD-10-CM

## 2024-01-18 PROCEDURE — 77067 SCR MAMMO BI INCL CAD: CPT

## 2024-01-18 PROCEDURE — 77063 BREAST TOMOSYNTHESIS BI: CPT

## 2024-01-19 LAB
CYSTATIN C SERPL-MCNC: 0.82 MG/L (ref 0.6–1)
GFR/BSA.PRED SERPLBLD CYS-BASED-ARV: 102 ML/MIN/1.73

## 2024-01-22 RX ORDER — LISINOPRIL AND HYDROCHLOROTHIAZIDE 12.5; 1 MG/1; MG/1
1 TABLET ORAL DAILY
Qty: 30 TABLET | Refills: 0 | OUTPATIENT
Start: 2024-01-22

## 2024-01-31 ENCOUNTER — TELEPHONE (OUTPATIENT)
Dept: FAMILY MEDICINE CLINIC | Facility: CLINIC | Age: 44
End: 2024-01-31
Payer: COMMERCIAL

## 2024-01-31 RX ORDER — LISINOPRIL AND HYDROCHLOROTHIAZIDE 12.5; 1 MG/1; MG/1
1 TABLET ORAL DAILY
Qty: 90 TABLET | Refills: 1 | Status: SHIPPED | OUTPATIENT
Start: 2024-01-31

## 2024-01-31 NOTE — TELEPHONE ENCOUNTER
Caller: Arash Pattie JERRELL    Relationship: Self    Best call back number: [phone]    Requested Prescriptions:   Requested Prescriptions     Pending Prescriptions Disp Refills    lisinopril-hydrochlorothiazide (PRINZIDE,ZESTORETIC) 10-12.5 MG per tablet 90 tablet 1     Sig: Take 1 tablet by mouth Daily.        Pharmacy where request should be sent: [id number] - [place], [place] - [address] AT [facility] [address] /[facility] & [place] - [phone] [facility] [phone] FX     Last office visit with prescribing clinician: 1/17/2024   Last telemedicine visit with prescribing clinician: Visit date not found   Next office visit with prescribing clinician: 4/18/2024     PATIENT STATES SHE WENT TO [facility] AND THEY STATED THEY DID NOT RECEIVE THIS REFILL FROM DOMINGO JORDAN.     Does the patient have less than a 3 day supply:  [x] Yes  [] No    Would you like a call back once the refill request has been completed: [] Yes [] No    If the office needs to give you a call back, can they leave a voicemail: [] Yes [] No    Maurice Ashton Rep   01/31/24 11:56 EST

## 2024-02-13 ENCOUNTER — PATIENT ROUNDING (BHMG ONLY) (OUTPATIENT)
Dept: FAMILY MEDICINE CLINIC | Facility: CLINIC | Age: 44
End: 2024-02-13

## 2024-02-22 DIAGNOSIS — E66.09 CLASS 1 OBESITY DUE TO EXCESS CALORIES WITH SERIOUS COMORBIDITY AND BODY MASS INDEX (BMI) OF 33.0 TO 33.9 IN ADULT: Primary | ICD-10-CM

## 2024-02-22 RX ORDER — SEMAGLUTIDE 0.25 MG/.5ML
0.25 INJECTION, SOLUTION SUBCUTANEOUS WEEKLY
Qty: 2 ML | Refills: 0 | Status: SHIPPED | OUTPATIENT
Start: 2024-02-22

## 2024-02-23 NOTE — NURSING NOTE
Call placed to Dr. Sanchez to discuss modifying dressing to Hydrofera dressings [facility]. He is ammendable for order change.   Keshia Sosa RN    
Price (Do Not Change): 0.00
Detail Level: Simple
Instructions: This plan will send the code FBSD to the PM system.  DO NOT or CHANGE the price.

## 2024-04-08 DIAGNOSIS — E66.09 CLASS 1 OBESITY DUE TO EXCESS CALORIES WITH SERIOUS COMORBIDITY AND BODY MASS INDEX (BMI) OF 33.0 TO 33.9 IN ADULT: Primary | ICD-10-CM

## 2024-04-08 NOTE — TELEPHONE ENCOUNTER
Starting dose of Zepbound was sent in 12/08/2023. Patient was able to  recently and start. She is ready for the next dose up.

## 2024-04-18 ENCOUNTER — LAB (OUTPATIENT)
Dept: LAB | Facility: HOSPITAL | Age: 44
End: 2024-04-18
Payer: COMMERCIAL

## 2024-04-18 ENCOUNTER — OFFICE VISIT (OUTPATIENT)
Dept: FAMILY MEDICINE CLINIC | Facility: CLINIC | Age: 44
End: 2024-04-18
Payer: COMMERCIAL

## 2024-04-18 VITALS
WEIGHT: 194 LBS | OXYGEN SATURATION: 98 % | TEMPERATURE: 97 F | HEIGHT: 65 IN | DIASTOLIC BLOOD PRESSURE: 81 MMHG | BODY MASS INDEX: 32.32 KG/M2 | HEART RATE: 73 BPM | SYSTOLIC BLOOD PRESSURE: 119 MMHG

## 2024-04-18 DIAGNOSIS — E78.2 MIXED HYPERLIPIDEMIA: ICD-10-CM

## 2024-04-18 DIAGNOSIS — L73.2 HIDRADENITIS SUPPURATIVA: ICD-10-CM

## 2024-04-18 DIAGNOSIS — D50.9 IRON DEFICIENCY ANEMIA, UNSPECIFIED IRON DEFICIENCY ANEMIA TYPE: ICD-10-CM

## 2024-04-18 DIAGNOSIS — N18.1 STAGE 1 CHRONIC KIDNEY DISEASE: ICD-10-CM

## 2024-04-18 DIAGNOSIS — Z13.29 SCREENING FOR THYROID DISORDER: ICD-10-CM

## 2024-04-18 DIAGNOSIS — E66.09 CLASS 1 OBESITY DUE TO EXCESS CALORIES WITH SERIOUS COMORBIDITY AND BODY MASS INDEX (BMI) OF 32.0 TO 32.9 IN ADULT: ICD-10-CM

## 2024-04-18 DIAGNOSIS — I10 PRIMARY HYPERTENSION: Primary | ICD-10-CM

## 2024-04-18 DIAGNOSIS — Z13.21 ENCOUNTER FOR VITAMIN DEFICIENCY SCREENING: ICD-10-CM

## 2024-04-18 LAB
25(OH)D3 SERPL-MCNC: 13.8 NG/ML (ref 30–100)
ALBUMIN SERPL-MCNC: 4.5 G/DL (ref 3.5–5.2)
ALBUMIN/GLOB SERPL: 1 G/DL
ALP SERPL-CCNC: 49 U/L (ref 39–117)
ALT SERPL W P-5'-P-CCNC: 20 U/L (ref 1–33)
ANION GAP SERPL CALCULATED.3IONS-SCNC: 11 MMOL/L (ref 5–15)
AST SERPL-CCNC: 15 U/L (ref 1–32)
BILIRUB SERPL-MCNC: 0.2 MG/DL (ref 0–1.2)
BUN SERPL-MCNC: 13 MG/DL (ref 6–20)
BUN/CREAT SERPL: 14.6 (ref 7–25)
CALCIUM SPEC-SCNC: 9.6 MG/DL (ref 8.6–10.5)
CHLORIDE SERPL-SCNC: 105 MMOL/L (ref 98–107)
CHOLEST SERPL-MCNC: 225 MG/DL (ref 0–200)
CO2 SERPL-SCNC: 24 MMOL/L (ref 22–29)
CREAT SERPL-MCNC: 0.89 MG/DL (ref 0.57–1)
DEPRECATED RDW RBC AUTO: 39.3 FL (ref 37–54)
EGFRCR SERPLBLD CKD-EPI 2021: 83.7 ML/MIN/1.73
EOSINOPHIL # BLD MANUAL: 0.36 10*3/MM3 (ref 0–0.4)
EOSINOPHIL NFR BLD MANUAL: 5.2 % (ref 0.3–6.2)
ERYTHROCYTE [DISTWIDTH] IN BLOOD BY AUTOMATED COUNT: 12.6 % (ref 12.3–15.4)
FERRITIN SERPL-MCNC: 93.4 NG/ML (ref 13–150)
GLOBULIN UR ELPH-MCNC: 4.3 GM/DL
GLUCOSE SERPL-MCNC: 82 MG/DL (ref 65–99)
HCT VFR BLD AUTO: 33.2 % (ref 34–46.6)
HDLC SERPL-MCNC: 39 MG/DL (ref 40–60)
HGB BLD-MCNC: 10.8 G/DL (ref 12–15.9)
IRON 24H UR-MRATE: 84 MCG/DL (ref 37–145)
IRON SATN MFR SERPL: 25 % (ref 20–50)
LDLC SERPL CALC-MCNC: 175 MG/DL (ref 0–100)
LDLC/HDLC SERPL: 4.45 {RATIO}
LYMPHOCYTES # BLD MANUAL: 3.47 10*3/MM3 (ref 0.7–3.1)
LYMPHOCYTES NFR BLD MANUAL: 6.2 % (ref 5–12)
MCH RBC QN AUTO: 28.1 PG (ref 26.6–33)
MCHC RBC AUTO-ENTMCNC: 32.5 G/DL (ref 31.5–35.7)
MCV RBC AUTO: 86.5 FL (ref 79–97)
MONOCYTES # BLD: 0.43 10*3/MM3 (ref 0.1–0.9)
MYELOCYTES NFR BLD MANUAL: 2.1 % (ref 0–0)
NEUTROPHILS # BLD AUTO: 2.48 10*3/MM3 (ref 1.7–7)
NEUTROPHILS NFR BLD MANUAL: 36.1 % (ref 42.7–76)
NRBC BLD AUTO-RTO: 0 /100 WBC (ref 0–0.2)
PLAT MORPH BLD: NORMAL
PLATELET # BLD AUTO: 356 10*3/MM3 (ref 140–450)
PMV BLD AUTO: 9.6 FL (ref 6–12)
POTASSIUM SERPL-SCNC: 4 MMOL/L (ref 3.5–5.2)
PROT SERPL-MCNC: 8.8 G/DL (ref 6–8.5)
RBC # BLD AUTO: 3.84 10*6/MM3 (ref 3.77–5.28)
RBC MORPH BLD: NORMAL
SODIUM SERPL-SCNC: 140 MMOL/L (ref 136–145)
TIBC SERPL-MCNC: 338 MCG/DL (ref 298–536)
TRANSFERRIN SERPL-MCNC: 227 MG/DL (ref 200–360)
TRIGL SERPL-MCNC: 63 MG/DL (ref 0–150)
TSH SERPL DL<=0.05 MIU/L-ACNC: 0.72 UIU/ML (ref 0.27–4.2)
VARIANT LYMPHS NFR BLD MANUAL: 50.5 % (ref 19.6–45.3)
VLDLC SERPL-MCNC: 11 MG/DL (ref 5–40)
WBC MORPH BLD: NORMAL
WBC NRBC COR # BLD AUTO: 6.88 10*3/MM3 (ref 3.4–10.8)

## 2024-04-18 PROCEDURE — 85007 BL SMEAR W/DIFF WBC COUNT: CPT

## 2024-04-18 PROCEDURE — 80050 GENERAL HEALTH PANEL: CPT

## 2024-04-18 PROCEDURE — 82728 ASSAY OF FERRITIN: CPT

## 2024-04-18 PROCEDURE — 83540 ASSAY OF IRON: CPT

## 2024-04-18 PROCEDURE — 84466 ASSAY OF TRANSFERRIN: CPT

## 2024-04-18 PROCEDURE — 82306 VITAMIN D 25 HYDROXY: CPT | Performed by: NURSE PRACTITIONER

## 2024-04-18 PROCEDURE — 80061 LIPID PANEL: CPT

## 2024-04-18 RX ORDER — MULTIVIT WITH MINERALS/LUTEIN
250 TABLET ORAL DAILY
Qty: 90 TABLET | Refills: 1 | Status: CANCELLED | OUTPATIENT
Start: 2024-04-18

## 2024-04-18 RX ORDER — LANOLIN ALCOHOL/MO/W.PET/CERES
325 CREAM (GRAM) TOPICAL
Qty: 90 TABLET | Refills: 1 | Status: CANCELLED | OUTPATIENT
Start: 2024-04-18

## 2024-04-18 RX ORDER — LEVONORGESTREL AND ETHINYL ESTRADIOL AND ETHINYL ESTRADIOL 150-30(84)
1 KIT ORAL DAILY
Qty: 90 TABLET | Refills: 3 | Status: CANCELLED | OUTPATIENT
Start: 2024-04-18

## 2024-04-18 RX ORDER — LISINOPRIL AND HYDROCHLOROTHIAZIDE 12.5; 1 MG/1; MG/1
1 TABLET ORAL DAILY
Qty: 90 TABLET | Refills: 1 | Status: SHIPPED | OUTPATIENT
Start: 2024-04-18

## 2024-04-18 NOTE — PROGRESS NOTES
Follow Up Office Visit      Patient Name: Pattie Antoine  : 1980   MRN: [id number]     Chief Complaint:    Chief Complaint   Patient presents with    Anemia    Hyperlipidemia    Hypertension    Chronic Kidney Disease       History of Present Illness: Pattie Antoine is a 43 y.o. female who is here today to follow up for HTN, hyperlipidemia, anemia, CKD.    Follow up new start zepbound     Labs-2023  Pap-2022 dr chambers    Mammogram-2024    Weight loss of 7 lbs on zepbound 2.5 mg once weekly x1 month, just increased to 5 mg once weekly     Follow up nephrology consult 2024 per progress note   Hypertension   - Continue current regimen including lisinopril-hydroCHLOROthiazide.  - Low sodium chloride intake < 2 gr recommended and encourage  - Lifestyle modification including regular exercise and dietary recommendations discussed  - The goal for the blood pressure less than 130/80, discussed   - Instruction about salt restriction, discussed     Hydradenitis suppurativa  - Currently on adalimumab q 80 q 2 weeks . since 5 yrs     Plan   - patient with normal renal function based on cystatin C , UA and UPCR negative   - Based on cystatin C   CKD-EPI Cystatin C (2012)   What is the patient’s ACR?† <30 mg/g    GFR category is:‡ G1   ACR category is:** A1   CKD classification is: G1/A1   Risk of progression is: Low   Frequency of monitoring should be: Once a year   Referral to a nephrologist is: Not needed     - Following appointment prn     Subjective      Review of Systems:   Review of Systems   Constitutional:  Negative for fever.   HENT:  Negative for ear pain and sore throat.    Respiratory:  Negative for cough.    Cardiovascular:  Negative for chest pain.   Gastrointestinal:  Negative for abdominal pain, constipation, diarrhea, nausea and vomiting.   Genitourinary:  Negative for dysuria.   Musculoskeletal:  Negative for myalgias.        Past Medical History:   Past Medical History:   Diagnosis  Date    Allergic     Allergies     Anemia     Condition not found     Mass    Hidradenitis     Hyperlipidemia     Right groin wound        Past Surgical History:   Past Surgical History:   Procedure Laterality Date    ABDOMINOPLASTY  2019    With flank liposuction     SECTION      HIDRADENITIS EXCISION      Drainage    INCISION AND DRAINAGE ABSCESS      INCISION AND DRAINAGE OF WOUND Bilateral 2023    Procedure: EXCISION OF HYDRAADENITIS RIGHT GROIN AND LEFT AXILLA;  Surgeon: Young Sanchez MD;  [facility]: [facility] [place];  Service: General;  Laterality: Bilateral;    PILONIDAL CYSTECTOMY         Family History:   Family History   Problem Relation Age of Onset    Kidney nephrosis Father     Kidney nephrosis Maternal Grandmother     Diabetes Other     Heart disease Other     Malig Hyperthermia Neg Hx        Social History:   Social History     Socioeconomic History    Marital status:    Tobacco Use    Smoking status: Never     Passive exposure: Never    Smokeless tobacco: Never   Vaping Use    Vaping status: Never Used   Substance and Sexual Activity    Alcohol use: Never    Drug use: Never    Sexual activity: Defer       Medications:     Current Outpatient Medications:     Adalimumab (Humira Pen) 40 MG/0.8ML Pen-injector Kit, Inject  under the skin into the appropriate area as directed Every 14 (Fourteen) Days. LAST DOSE 9-10-23, Disp: , Rfl:     Ashlyna 0.15-0.03 &0.01 MG, Take 1 tablet by mouth Daily., Disp: 90 tablet, Rfl: 3    ferrous sulfate 325 (65 FE) MG EC tablet, Take 1 tablet by mouth Daily With Breakfast., Disp: 90 tablet, Rfl: 1    lisinopril-hydrochlorothiazide (PRINZIDE,ZESTORETIC) 10-12.5 MG per tablet, Take 1 tablet by mouth Daily., Disp: 90 tablet, Rfl: 1    Tirzepatide-Weight Management (ZEPBOUND) 5 MG/0.5ML solution auto-injector, Inject 0.5 mL under the skin into the appropriate area as directed 1 (One) Time Per Week., Disp: 2 mL, Rfl: 0    vitamin C (ASCORBIC ACID) 250  "MG tablet, Take 1 tablet by mouth Daily., Disp: 90 tablet, Rfl: 1    Allergies:   Allergies   Allergen Reactions    Sulfamethoxazole-Trimethoprim Hives             Objective     Physical Exam:  Vital Signs:   Vitals:    04/18/24 1000   BP: 119/81   Pulse: 73   Temp: 97 °F (36.1 °C)   SpO2: 98%   Weight: 88 kg (194 lb)   Height: 165.1 cm (65\")     Body mass index is 32.28 kg/m².           Physical Exam  HENT:      Right Ear: Tympanic membrane normal.      Left Ear: Tympanic membrane normal.      Nose: Nose normal.      Mouth/Throat:      Mouth: Mucous membranes are moist.   Eyes:      Conjunctiva/sclera: Conjunctivae normal.   Neck:      Vascular: No carotid bruit.   Cardiovascular:      Rate and Rhythm: Normal rate and regular rhythm.      Heart sounds: Normal heart sounds. No murmur heard.  Pulmonary:      Effort: Pulmonary effort is normal.      Breath sounds: Normal breath sounds.   Abdominal:      General: Bowel sounds are normal.      Palpations: Abdomen is soft.   Musculoskeletal:      Right lower leg: No edema.      Left lower leg: No edema.   Skin:     General: Skin is warm and dry.   Neurological:      Mental Status: She is alert.   Psychiatric:         Mood and Affect: Mood normal.         Behavior: Behavior normal.             Assessment / Plan      Assessment/Plan:   Diagnoses and all orders for this visit:    1. Primary hypertension (Primary)    2. Mixed hyperlipidemia    3. Stage 1 chronic kidney disease    4. Iron deficiency anemia, unspecified iron deficiency anemia type    5. Hidradenitis suppurativa    6. Class 1 obesity due to excess calories with serious comorbidity and body mass index (BMI) of 32.0 to 32.9 in adult    7. Encounter for vitamin deficiency screening  -     Vitamin D,25-Hydroxy    Other orders  -     lisinopril-hydrochlorothiazide (PRINZIDE,ZESTORETIC) 10-12.5 MG per tablet; Take 1 tablet by mouth Daily.  Dispense: 90 tablet; Refill: 1       Hypertension currently controlled with " "lisinopril hydrochlorothiazide will provide a refill  Hyperlipidemia reduce fried foods red meat pork products cheese increase fruits vegetables whole grains overall increase fiber in diet exercise 30 minutes daily weight loss  Stage I chronic kidney disease reviewed nephrology consult will continue to monitor avoid all NSAIDs increase water intake  Iron deficiency anemia we will monitor labs for further recommendations for supplementations  Hidradentitis suppurativa currently managed by dermatology on Humira injections at this time  Class I obesity with comorbidity of hypertension hyperlipidemia currently on stepdown recommend reducing overall caloric intake increasing exercise to 30 minutes daily        Follow Up:   Return in about 6 months (around 10/18/2024).    Davi Hirsch, APRN    \"Please note that portions of this note were completed with a voice recognition program.\"    "

## 2024-04-19 RX ORDER — ERGOCALCIFEROL 1.25 MG/1
50000 CAPSULE ORAL WEEKLY
Qty: 13 CAPSULE | Refills: 1 | Status: SHIPPED | OUTPATIENT
Start: 2024-04-19

## 2024-05-06 DIAGNOSIS — E66.09 CLASS 1 OBESITY DUE TO EXCESS CALORIES WITH SERIOUS COMORBIDITY AND BODY MASS INDEX (BMI) OF 33.0 TO 33.9 IN ADULT: ICD-10-CM

## 2024-05-16 DIAGNOSIS — E66.09 CLASS 1 OBESITY DUE TO EXCESS CALORIES WITH SERIOUS COMORBIDITY AND BODY MASS INDEX (BMI) OF 33.0 TO 33.9 IN ADULT: ICD-10-CM

## 2024-05-20 ENCOUNTER — PATIENT MESSAGE (OUTPATIENT)
Dept: FAMILY MEDICINE CLINIC | Facility: CLINIC | Age: 44
End: 2024-05-20
Payer: COMMERCIAL

## 2024-05-29 ENCOUNTER — HOSPITAL ENCOUNTER (EMERGENCY)
Facility: HOSPITAL | Age: 44
Discharge: HOME | End: 2024-05-29
Attending: EMERGENCY MEDICINE
Payer: COMMERCIAL

## 2024-05-29 ENCOUNTER — APPOINTMENT (OUTPATIENT)
Dept: CT IMAGING | Facility: HOSPITAL | Age: 44
End: 2024-05-29
Payer: COMMERCIAL

## 2024-05-29 VITALS
HEIGHT: 63 IN | DIASTOLIC BLOOD PRESSURE: 94 MMHG | WEIGHT: 193.34 LBS | RESPIRATION RATE: 18 BRPM | BODY MASS INDEX: 34.26 KG/M2 | SYSTOLIC BLOOD PRESSURE: 124 MMHG | TEMPERATURE: 97.8 F | OXYGEN SATURATION: 100 % | HEART RATE: 74 BPM

## 2024-05-29 DIAGNOSIS — R10.32 LEFT LOWER QUADRANT ABDOMINAL PAIN: Primary | ICD-10-CM

## 2024-05-29 DIAGNOSIS — L03.818 CELLULITIS OF OTHER SPECIFIED SITE: ICD-10-CM

## 2024-05-29 LAB
ALBUMIN SERPL-MCNC: 4.2 G/DL (ref 3.5–5.2)
ALBUMIN/GLOB SERPL: 0.9 G/DL
ALP SERPL-CCNC: 56 U/L (ref 39–117)
ALT SERPL W P-5'-P-CCNC: 22 U/L (ref 1–33)
ANION GAP SERPL CALCULATED.3IONS-SCNC: 9.9 MMOL/L (ref 5–15)
AST SERPL-CCNC: 18 U/L (ref 1–32)
BACTERIA UR QL AUTO: ABNORMAL /HPF
BASOPHILS # BLD AUTO: 0.06 10*3/MM3 (ref 0–0.2)
BASOPHILS NFR BLD AUTO: 0.6 % (ref 0–1.5)
BILIRUB SERPL-MCNC: <0.2 MG/DL (ref 0–1.2)
BILIRUB UR QL STRIP: NEGATIVE
BUN SERPL-MCNC: 12 MG/DL (ref 6–20)
BUN/CREAT SERPL: 11.9 (ref 7–25)
CALCIUM SPEC-SCNC: 9 MG/DL (ref 8.6–10.5)
CHLORIDE SERPL-SCNC: 101 MMOL/L (ref 98–107)
CLARITY UR: CLEAR
CO2 SERPL-SCNC: 23.1 MMOL/L (ref 22–29)
COLOR UR: YELLOW
CREAT SERPL-MCNC: 1.01 MG/DL (ref 0.57–1)
DEPRECATED RDW RBC AUTO: 39.8 FL (ref 37–54)
EGFRCR SERPLBLD CKD-EPI 2021: 71.9 ML/MIN/1.73
EOSINOPHIL # BLD AUTO: 0.24 10*3/MM3 (ref 0–0.4)
EOSINOPHIL NFR BLD AUTO: 2.5 % (ref 0.3–6.2)
ERYTHROCYTE [DISTWIDTH] IN BLOOD BY AUTOMATED COUNT: 13.1 % (ref 12.3–15.4)
GLOBULIN UR ELPH-MCNC: 4.8 GM/DL
GLUCOSE SERPL-MCNC: 76 MG/DL (ref 65–99)
GLUCOSE UR STRIP-MCNC: NEGATIVE MG/DL
HCT VFR BLD AUTO: 30.7 % (ref 34–46.6)
HGB BLD-MCNC: 10.3 G/DL (ref 12–15.9)
HGB UR QL STRIP.AUTO: NEGATIVE
HOLD SPECIMEN: NORMAL
HOLD SPECIMEN: NORMAL
HYALINE CASTS UR QL AUTO: ABNORMAL /LPF
IMM GRANULOCYTES # BLD AUTO: 0.02 10*3/MM3 (ref 0–0.05)
IMM GRANULOCYTES NFR BLD AUTO: 0.2 % (ref 0–0.5)
KETONES UR QL STRIP: NEGATIVE
LEUKOCYTE ESTERASE UR QL STRIP.AUTO: ABNORMAL
LIPASE SERPL-CCNC: 35 U/L (ref 13–60)
LYMPHOCYTES # BLD AUTO: 4.41 10*3/MM3 (ref 0.7–3.1)
LYMPHOCYTES NFR BLD AUTO: 45.6 % (ref 19.6–45.3)
MCH RBC QN AUTO: 28 PG (ref 26.6–33)
MCHC RBC AUTO-ENTMCNC: 33.6 G/DL (ref 31.5–35.7)
MCV RBC AUTO: 83.4 FL (ref 79–97)
MONOCYTES # BLD AUTO: 0.77 10*3/MM3 (ref 0.1–0.9)
MONOCYTES NFR BLD AUTO: 8 % (ref 5–12)
NEUTROPHILS NFR BLD AUTO: 4.17 10*3/MM3 (ref 1.7–7)
NEUTROPHILS NFR BLD AUTO: 43.1 % (ref 42.7–76)
NITRITE UR QL STRIP: NEGATIVE
NRBC BLD AUTO-RTO: 0 /100 WBC (ref 0–0.2)
PH UR STRIP.AUTO: 6.5 [PH] (ref 5–8)
PLATELET # BLD AUTO: 351 10*3/MM3 (ref 140–450)
PMV BLD AUTO: 9.1 FL (ref 6–12)
POTASSIUM SERPL-SCNC: 3.3 MMOL/L (ref 3.5–5.2)
PROT SERPL-MCNC: 9 G/DL (ref 6–8.5)
PROT UR QL STRIP: NEGATIVE
RBC # BLD AUTO: 3.68 10*6/MM3 (ref 3.77–5.28)
RBC # UR STRIP: ABNORMAL /HPF
REF LAB TEST METHOD: ABNORMAL
SODIUM SERPL-SCNC: 134 MMOL/L (ref 136–145)
SP GR UR STRIP: 1.01 (ref 1–1.03)
SQUAMOUS #/AREA URNS HPF: ABNORMAL /HPF
TROPONIN T SERPL HS-MCNC: <6 NG/L
UROBILINOGEN UR QL STRIP: ABNORMAL
WBC # UR STRIP: ABNORMAL /HPF
WBC NRBC COR # BLD AUTO: 9.67 10*3/MM3 (ref 3.4–10.8)
WHOLE BLOOD HOLD COAG: NORMAL
WHOLE BLOOD HOLD SPECIMEN: NORMAL

## 2024-05-29 PROCEDURE — 83690 ASSAY OF LIPASE: CPT | Performed by: EMERGENCY MEDICINE

## 2024-05-29 PROCEDURE — 80053 COMPREHEN METABOLIC PANEL: CPT | Performed by: EMERGENCY MEDICINE

## 2024-05-29 PROCEDURE — 93005 ELECTROCARDIOGRAM TRACING: CPT | Performed by: EMERGENCY MEDICINE

## 2024-05-29 PROCEDURE — 25510000001 IOPAMIDOL PER 1 ML: Performed by: EMERGENCY MEDICINE

## 2024-05-29 PROCEDURE — 99285 EMERGENCY DEPT VISIT HI MDM: CPT

## 2024-05-29 PROCEDURE — 85025 COMPLETE CBC W/AUTO DIFF WBC: CPT | Performed by: EMERGENCY MEDICINE

## 2024-05-29 PROCEDURE — 81001 URINALYSIS AUTO W/SCOPE: CPT | Performed by: EMERGENCY MEDICINE

## 2024-05-29 PROCEDURE — 74177 CT ABD & PELVIS W/CONTRAST: CPT

## 2024-05-29 PROCEDURE — 84484 ASSAY OF TROPONIN QUANT: CPT | Performed by: EMERGENCY MEDICINE

## 2024-05-29 RX ORDER — IBUPROFEN 600 MG/1
600 TABLET ORAL EVERY 8 HOURS PRN
Qty: 30 TABLET | Refills: 0 | Status: SHIPPED | OUTPATIENT
Start: 2024-05-29

## 2024-05-29 RX ORDER — SODIUM CHLORIDE 0.9 % (FLUSH) 0.9 %
10 SYRINGE (ML) INJECTION AS NEEDED
Status: DISCONTINUED | OUTPATIENT
Start: 2024-05-29 | End: 2024-05-30 | Stop reason: HOSPADM

## 2024-05-29 RX ORDER — DOXYCYCLINE 100 MG/1
100 CAPSULE ORAL 2 TIMES DAILY
Qty: 10 CAPSULE | Refills: 0 | Status: SHIPPED | OUTPATIENT
Start: 2024-05-29 | End: 2024-06-03

## 2024-05-29 RX ADMIN — IOPAMIDOL 90 ML: 755 INJECTION, SOLUTION INTRAVENOUS at 20:13

## 2024-05-29 NOTE — ED PROVIDER NOTES
Time: 6:00 PM EDT  Date of encounter:  2024  Independent Historian/Clinical History and Information was obtained by:   Patient    History is limited by: N/A    Chief Complaint: Abdominal pain      History of Present Illness:  Patient is a 43 y.o. year old female who presents to the emergency department for evaluation of left lower quadrant abdominal pain for the past 4 days.  Patient reports she is currently taking Zepbound, states sometimes it causes her to have constipation, states she took 3 stool softeners yesterday and had several bowel movements, reports mild improvement in her symptoms after that.  Patient denies nausea or vomiting, denies fevers, denies urinary symptoms.  Patient states the pain worsens with movement or jarring.  Patient also reports area of redness in drainage in the right groin related to her hidradenitis suppurativa, states she takes Humira injections and is managed by dermatology.    HPI    Patient Care Team  Primary Care Provider: Joselito Hirsch APRN    Past Medical History:     Allergies   Allergen Reactions    Sulfamethoxazole-Trimethoprim Hives     Past Medical History:   Diagnosis Date    Allergic     Allergies     Anemia     Condition not found     Mass    Hidradenitis     Hyperlipidemia     Right groin wound      Past Surgical History:   Procedure Laterality Date    ABDOMINOPLASTY  2019    With flank liposuction     SECTION      HIDRADENITIS EXCISION      Drainage    INCISION AND DRAINAGE ABSCESS      INCISION AND DRAINAGE OF WOUND Bilateral 2023    Procedure: EXCISION OF HYDRAADENITIS RIGHT GROIN AND LEFT AXILLA;  Surgeon: Young Sanchez MD;  [place] [facility] [place] [facility] [place]  Service: General;  Laterality: Bilateral;    PILONIDAL CYSTECTOMY       Family History   Problem Relation Age of Onset    Kidney nephrosis Father     Kidney nephrosis Maternal Grandmother     Diabetes Other     Heart disease Other     Malig Hyperthermia Neg Hx        Home  Medications:  Prior to Admission medications    Medication Sig Start Date End Date Taking? Authorizing Provider   Adalimumab (Humira Pen) 40 MG/0.8ML Pen-injector Kit Inject  under the skin into the appropriate area as directed Every 14 (Fourteen) Days. LAST DOSE 9-10-23    Provider, MD Yahir Hensley 0.15-0.03 &0.01 MG Take 1 tablet by mouth Daily. 1/17/24   Joselito Hirsch APRN   ferrous sulfate 325 (65 FE) MG EC tablet Take 1 tablet by mouth Daily With Breakfast. 1/17/24   Joselito Hirsch APRN   lisinopril-hydrochlorothiazide (PRINZIDE,ZESTORETIC) 10-12.5 MG per tablet Take 1 tablet by mouth Daily. 4/18/24   Joselito Hirsch APRN   Tirzepatide-Weight Management (ZEPBOUND) 2.5 MG/0.5ML solution auto-injector Inject 0.5 mL under the skin into the appropriate area as directed 1 (One) Time Per Week. 5/21/24   Joselito Hirsch APRN   Tirzepatide-Weight Management (ZEPBOUND) 7.5 MG/0.5ML solution auto-injector Inject 0.5 mL under the skin into the appropriate area as directed 1 (One) Time Per Week. 5/16/24   Joselito Hirsch APRN   vitamin C (ASCORBIC ACID) 250 MG tablet Take 1 tablet by mouth Daily. 1/17/24   Joselito Hirsch APRN   vitamin D (ERGOCALCIFEROL) 1.25 MG (50362 UT) capsule capsule Take 1 capsule by mouth 1 (One) Time Per Week. 4/19/24   Joselito Hirsch APRN        Social History:   Social History     Tobacco Use    Smoking status: Never     Passive exposure: Never    Smokeless tobacco: Never   Vaping Use    Vaping status: Never Used   Substance Use Topics    Alcohol use: Never    Drug use: Never         Review of Systems:  Review of Systems   Constitutional:  Negative for fever.   HENT:  Negative for sore throat.    Eyes: Negative.    Respiratory:  Negative for cough and shortness of breath.    Cardiovascular:  Negative for chest pain.   Gastrointestinal:  Positive for abdominal pain and constipation. Negative for diarrhea and  "vomiting.   Genitourinary:  Negative for dysuria.   Musculoskeletal:  Negative for neck pain.   Skin:  Negative for rash.   Allergic/Immunologic: Negative.    Neurological:  Negative for weakness, numbness and headaches.   Hematological: Negative.    Psychiatric/Behavioral: Negative.     All other systems reviewed and are negative.       Physical Exam:  /87 (BP Location: Right arm, Patient Position: Sitting)   Pulse 83   Temp 98 °F (36.7 °C) (Oral)   Resp 18   Ht 160 cm (63\")   Wt 87.7 kg (193 lb 5.5 oz)   LMP 03/30/2024   SpO2 100%   BMI 34.25 kg/m²     Physical Exam  Vitals and nursing note reviewed.   Constitutional:       General: She is not in acute distress.     Appearance: Normal appearance. She is not toxic-appearing.   HENT:      Head: Normocephalic and atraumatic.      Jaw: There is normal jaw occlusion.   Eyes:      General: Lids are normal.      Extraocular Movements: Extraocular movements intact.      Conjunctiva/sclera: Conjunctivae normal.      Pupils: Pupils are equal, round, and reactive to light.   Cardiovascular:      Rate and Rhythm: Normal rate and regular rhythm.      Pulses: Normal pulses.      Heart sounds: Normal heart sounds.   Pulmonary:      Effort: Pulmonary effort is normal. No respiratory distress.      Breath sounds: Normal breath sounds. No wheezing or rhonchi.   Abdominal:      General: Abdomen is flat.      Palpations: Abdomen is soft.      Tenderness: There is abdominal tenderness in the right lower quadrant and left lower quadrant. There is no guarding or rebound.   Musculoskeletal:         General: Normal range of motion.      Cervical back: Normal range of motion and neck supple.      Right lower leg: No edema.      Left lower leg: No edema.   Skin:     General: Skin is warm and dry.   Neurological:      Mental Status: She is alert and oriented to person, place, and time. Mental status is at baseline.   Psychiatric:         Mood and Affect: Mood normal.          "   Procedures:  Procedures      Medical Decision Making:      Comorbidities that affect care:    Hypertension, Obesity    External Notes reviewed:    Previous Clinic Note: Family medicine office visit from 4/18/2024      The following orders were placed and all results were independently analyzed by me:  Orders Placed This Encounter   Procedures    CT Abdomen Pelvis With Contrast    [place] Draw    Comprehensive Metabolic Panel    Lipase    Single High Sensitivity Troponin T    Urinalysis With Microscopic If Indicated (No Culture) - Urine, Clean Catch    CBC Auto Differential    Urinalysis, Microscopic Only - Urine, Clean Catch    NPO Diet NPO Type: Strict NPO    Undress & Gown    Continuous Pulse Oximetry    Vital Signs    Oxygen Therapy- Nasal Cannula; Titrate 1-6 LPM Per SpO2; 90 - 95%    ECG 12 Lead ED Triage Standing Order; Abdominal Pain (Upper)    Insert Peripheral IV    CBC & Differential    Green Top (Gel)    Lavender Top    Gold Top - SST    Light Blue Top       Medications Given in the Emergency Department:  Medications   sodium chloride 0.9 % flush 10 mL (has no administration in time range)   iopamidol (ISOVUE-370) 76 % injection 100 mL (90 mL Intravenous Given 5/29/24 2013)        ED Course:         Labs:    Lab Results (last 24 hours)       Procedure Component Value Units Date/Time    Urinalysis With Microscopic If Indicated (No Culture) - Urine, Clean Catch [[id number]]  (Abnormal) Collected: 05/29/24 1840    Specimen: Urine, Clean Catch Updated: 05/29/24 1921     Color, UA Yellow     Appearance, UA Clear     pH, UA 6.5     Specific Gravity, UA 1.006     Glucose, UA Negative     Ketones, UA Negative     Bilirubin, UA Negative     Blood, UA Negative     Protein, UA Negative     Leuk Esterase, UA Moderate (2+)     Nitrite, UA Negative     Urobilinogen, UA 0.2 E.U./dL    Urinalysis, Microscopic Only - Urine, Clean Catch [[id number]]  (Abnormal) Collected: 05/29/24 1840    Specimen: Urine, Clean Catch  Updated: 05/29/24 1953     RBC, UA None Seen /HPF      WBC, UA 6-10 /HPF      Bacteria, UA 1+ /HPF      Squamous Epithelial Cells, UA 3-6 /HPF      Hyaline Casts, UA 0-2 /LPF      Methodology Manual Light Microscopy    CBC & Differential [[id number]]  (Abnormal) Collected: 05/29/24 1847    Specimen: Blood Updated: 05/29/24 1854    Narrative:      The following orders were created for panel order CBC & Differential.  Procedure                               Abnormality         Status                     ---------                               -----------         ------                     CBC Auto Differential[[id number]]        Abnormal            Final result                 Please view results for these tests on the individual orders.    Comprehensive Metabolic Panel [[id number]]  (Abnormal) Collected: 05/29/24 1847    Specimen: Blood Updated: 05/29/24 1916     Glucose 76 mg/dL      BUN 12 mg/dL      Creatinine 1.01 mg/dL      Sodium 134 mmol/L      Potassium 3.3 mmol/L      Chloride 101 mmol/L      CO2 23.1 mmol/L      Calcium 9.0 mg/dL      Total Protein 9.0 g/dL      Albumin 4.2 g/dL      ALT (SGPT) 22 U/L      AST (SGOT) 18 U/L      Alkaline Phosphatase 56 U/L      Total Bilirubin <0.2 mg/dL      Globulin 4.8 gm/dL      A/G Ratio 0.9 g/dL      BUN/Creatinine Ratio 11.9     Anion Gap 9.9 mmol/L      eGFR 71.9 mL/min/1.73     Narrative:      GFR Normal >60  Chronic Kidney Disease <60  Kidney Failure <15      Lipase [[id number]]  (Normal) Collected: 05/29/24 1847    Specimen: Blood Updated: 05/29/24 1916     Lipase 35 U/L     Single High Sensitivity Troponin T [[id number]]  (Normal) Collected: 05/29/24 1847    Specimen: Blood Updated: 05/29/24 1916     HS Troponin T <6 ng/L     Narrative:      High Sensitive Troponin T Reference Range:  <14.0 ng/L- Negative Female for AMI  <22.0 ng/L- Negative Male for AMI  >=14 - Abnormal Female indicating possible myocardial injury.  >=22 - Abnormal Male indicating possible  myocardial injury.   Clinicians would have to utilize clinical acumen, EKG, Troponin, and serial changes to determine if it is an Acute Myocardial Infarction or myocardial injury due to an underlying chronic condition.         CBC Auto Differential [[id number]]  (Abnormal) Collected: 05/29/24 1847    Specimen: Blood Updated: 05/29/24 1854     WBC 9.67 10*3/mm3      RBC 3.68 10*6/mm3      Hemoglobin 10.3 g/dL      Hematocrit 30.7 %      MCV 83.4 fL      MCH 28.0 pg      MCHC 33.6 g/dL      RDW 13.1 %      RDW-SD 39.8 fl      MPV 9.1 fL      Platelets 351 10*3/mm3      Neutrophil % 43.1 %      Lymphocyte % 45.6 %      Monocyte % 8.0 %      Eosinophil % 2.5 %      Basophil % 0.6 %      Immature Grans % 0.2 %      Neutrophils, Absolute 4.17 10*3/mm3      Lymphocytes, Absolute 4.41 10*3/mm3      Monocytes, Absolute 0.77 10*3/mm3      Eosinophils, Absolute 0.24 10*3/mm3      Basophils, Absolute 0.06 10*3/mm3      Immature Grans, Absolute 0.02 10*3/mm3      nRBC 0.0 /100 WBC              Imaging:    CT Abdomen Pelvis With Contrast    Result Date: 5/29/2024  CT ABDOMEN PELVIS WITH CONTRAST  HISTORY: Left lower quadrant pain for 3 days.  COMPARISON: None.  TECHNIQUE:  CT of Abdomen and Pelvis with IV contrast performed.  Sagittal and Coronal reconstructions performed. Radiation dose reduction techniques included automated exposure control or exposure modulation based on body size.  FINDINGS:  Lung bases are clear. Abdominal aorta is normal in caliber. Gallbladder is normal. No biliary obstruction. The solid abdominal organs are normal. The kidneys are nonobstructed. Urinary bladder is normal. There are multiple phleboliths in the pelvis. There is an ovoid fat density lesion in the left adnexa adjacent to the left ovary measuring about 1.6 x 0.9 x 2.1 cm. This could reflect a dermoid. This can be assessed with nonemergent pelvic ultrasound. Solid pelvic organs are otherwise normal.  The appendix is normal. No evidence of acute  colitis. No small bowel obstruction is seen. The stomach is normal. There is no retroperitoneal adenopathy. There is some dependent free fluid in the pelvis that is nonspecific and possibly physiologic. There is left inguinal adenopathy measuring 1.4 cm. Mildly prominent lymph nodes are noted in the right inguinal region as well. There is skin thickening across the mons pubis. Correlate for cellulitis.       1. The kidneys are normal and nonobstructed. 2. Unopacified GI tract is grossly normal including the appendix. 3. Free fluid in the cul-de-sac, nonspecific but possibly physiologic. 4. Left inguinal adenopathy with a few prominent lymph nodes in the right inguinal region. These are probably reactive. Additionally, there is some skin thickening in the mons pubis. Cellulitis should be excluded. No other adenopathy in the abdomen or pelvis. 5. Fat density ovoid lesion in the left adnexa, potentially an ovarian dermoid. This can be further characterized with nonemergent pelvic ultrasound.   Electronically Signed By-Dr. Modesto Miller MD On:5/29/2024 9:26 PM         Differential Diagnosis and Discussion:    Abdominal Pain: Based on the patient's signs and symptoms, I considered abdominal aortic aneurysm, small bowel obstruction, pancreatitis, acute cholecystitis, acute appendecitis, peptic ulcer disease, gastritis, colitis, endocrine disorders, irritable bowel syndrome and other differential diagnosis an etiology of the patient's abdominal pain.    All labs were reviewed and interpreted by me.  CT scan radiology impression was interpreted by me.    MDM     Amount and/or Complexity of Data Reviewed  Clinical lab tests: reviewed  Tests in the radiology section of CPT®: reviewed  Tests in the medicine section of CPT®: reviewed  Decide to obtain previous medical records or to obtain history from someone other than the patient: yes    The patient is resting comfortably and feels better, is alert and in no distress.  Repeat examination is unremarkable and benign; in particular, there's no discomfort at McBurney's point and there is no pulsatile mass. The history, exam, diagnostic testing, and current condition does not suggest acute appendicitis, bowel obstruction, acute cholecystitis, bowel perforation, major gastrointestinal bleeding, severe diverticulitis, abdominal aortic aneurysm, mesenteric ischemia, volvulus, sepsis, or other significant pathology that warrants further testing, continued ED treatment, admission, for surgical evaluation at this point. The vital signs have been stable. The patient does not have uncontrollable pain, intractable vomiting, or other significant symptoms. The patient's condition is stable and appropriate for discharge from the emergency department.  Patient was instructed to follow-up with her OB/GYN specialist regarding the left ovarian dermoid cyst.  CT of the abdomen and pelvis shows skin thickening in the mons pubis, and exam shows possible cellulitis.  Also instructed patient to follow-up with her dermatology specialist regarding the hidradenitis suppurativa.            Patient Care Considerations:    CONSULT: I considered consulting OB/GYN, however patient is appropriate for nonemergent follow-up as an outpatient.      Consultants/Shared Management Plan:    None    Social Determinants of Health:    Patient is independent, reliable, and has access to care.       Disposition and Care Coordination:    Discharged: The patient is suitable and stable for discharge with no need for consideration of admission.    I have explained the patient´s condition, diagnoses and treatment plan based on the information available to me at this time. I have answered questions and addressed any concerns. The patient has a good  understanding of the patient´s diagnosis, condition, and treatment plan as can be expected at this point. The vital signs have been stable. The patient´s condition is stable and  appropriate for discharge from the emergency department.      The patient will pursue further outpatient evaluation with the primary care physician or other designated or consulting physician as outlined in the discharge instructions. They are agreeable to this plan of care and follow-up instructions have been explained in detail. The patient has received these instructions in written format and has expressed an understanding of the discharge instructions. The patient is aware that any significant change in condition or worsening of symptoms should prompt an immediate return to this or the closest emergency department or call to 911.  I have explained discharge medications and the need for follow up with the patient/caretakers. This was also printed in the discharge instructions. Patient was discharged with the following medications and follow up:      Medication List        New Prescriptions      doxycycline 100 MG capsule  Commonly known as: MONODOX  Take 1 capsule by mouth 2 (Two) Times a Day for 5 days.     ibuprofen 600 MG tablet  Commonly known as: ADVIL,MOTRIN  Take 1 tablet by mouth Every 8 (Eight) Hours As Needed for Moderate Pain.               Where to Get Your Medications        These medications were sent to [id number] - [place], [place] [id number] [facility] AT [facility] OF [address] & [place] - [phone]  - [phone] FX  [address] [facility] [place] [address]      Phone: [phone]   doxycycline 100 MG capsule  ibuprofen 600 MG tablet      Joselito Hirsch, APRN  [address] [facility] [place] [address] [phone]    Call in 2 days         Final diagnoses:   Left lower quadrant abdominal pain   Cellulitis of other specified site        ED Disposition       ED Disposition   Discharge    Condition   Stable    Comment   --               This medical record created using voice recognition software.             Elza Ospina APRN  05/29/24 [id number]

## 2024-05-30 LAB
QT INTERVAL: 404 MS
QTC INTERVAL: 443 MS

## 2024-05-30 NOTE — DISCHARGE INSTRUCTIONS
Make sure to stay hydrated by drinking plenty of fluids and get some rest.  Follow-up with your OB/GYN specialist regarding follow-up for the ovarian dermoid cyst on your left ovary.  Continue taking the antibiotics until the entire course is finished and follow-up with your dermatology specialist.  Make sure you follow-up with your primary care provider as needed.

## 2024-06-05 ENCOUNTER — OFFICE VISIT (OUTPATIENT)
Dept: FAMILY MEDICINE CLINIC | Facility: CLINIC | Age: 44
End: 2024-06-05
Payer: COMMERCIAL

## 2024-06-05 VITALS
TEMPERATURE: 97.8 F | DIASTOLIC BLOOD PRESSURE: 68 MMHG | HEART RATE: 73 BPM | BODY MASS INDEX: 33.66 KG/M2 | SYSTOLIC BLOOD PRESSURE: 100 MMHG | WEIGHT: 190 LBS | OXYGEN SATURATION: 100 % | HEIGHT: 63 IN

## 2024-06-05 DIAGNOSIS — L30.9 DERMATITIS: ICD-10-CM

## 2024-06-05 DIAGNOSIS — R10.32 LEFT LOWER QUADRANT ABDOMINAL PAIN: ICD-10-CM

## 2024-06-05 DIAGNOSIS — N89.8 VAGINAL DISCHARGE: ICD-10-CM

## 2024-06-05 DIAGNOSIS — Z12.4 SCREENING FOR MALIGNANT NEOPLASM OF CERVIX: ICD-10-CM

## 2024-06-05 DIAGNOSIS — Z01.419 ENCOUNTER FOR WELL WOMAN EXAM WITH ROUTINE GYNECOLOGICAL EXAM: Primary | ICD-10-CM

## 2024-06-05 LAB
CANDIDA SPECIES: NEGATIVE
GARDNERELLA VAGINALIS: NEGATIVE
T VAGINALIS DNA VAG QL PROBE+SIG AMP: NEGATIVE

## 2024-06-05 PROCEDURE — 87660 TRICHOMONAS VAGIN DIR PROBE: CPT | Performed by: NURSE PRACTITIONER

## 2024-06-05 PROCEDURE — 96372 THER/PROPH/DIAG INJ SC/IM: CPT | Performed by: NURSE PRACTITIONER

## 2024-06-05 PROCEDURE — G0123 SCREEN CERV/VAG THIN LAYER: HCPCS | Performed by: NURSE PRACTITIONER

## 2024-06-05 PROCEDURE — 87510 GARDNER VAG DNA DIR PROBE: CPT | Performed by: NURSE PRACTITIONER

## 2024-06-05 PROCEDURE — 87624 HPV HI-RISK TYP POOLED RSLT: CPT | Performed by: NURSE PRACTITIONER

## 2024-06-05 PROCEDURE — 99213 OFFICE O/P EST LOW 20 MIN: CPT | Performed by: NURSE PRACTITIONER

## 2024-06-05 PROCEDURE — 87480 CANDIDA DNA DIR PROBE: CPT | Performed by: NURSE PRACTITIONER

## 2024-06-05 PROCEDURE — 99396 PREV VISIT EST AGE 40-64: CPT | Performed by: NURSE PRACTITIONER

## 2024-06-05 RX ORDER — METHYLPREDNISOLONE ACETATE 80 MG/ML
80 INJECTION, SUSPENSION INTRA-ARTICULAR; INTRALESIONAL; INTRAMUSCULAR; SOFT TISSUE ONCE
Status: COMPLETED | OUTPATIENT
Start: 2024-06-05 | End: 2024-06-05

## 2024-06-05 RX ADMIN — METHYLPREDNISOLONE ACETATE 80 MG: 80 INJECTION, SUSPENSION INTRA-ARTICULAR; INTRALESIONAL; INTRAMUSCULAR; SOFT TISSUE at 11:14

## 2024-06-05 NOTE — PROGRESS NOTES
Female Physical / PAP Note      Patient Name: Pattie Antoine  : 1980   MRN: [id number]     Chief Complaint:    Chief Complaint   Patient presents with    Gynecologic Exam       History of Present Illness: Pattie Antoine is a 43 y.o. female who is here today for her annual health maintenance and physical.     Pap-2022  Lmp- 24  Mammogram 2024    Follow up ER [facility] 2024  Chief Complaint: Abdominal pain     History of Present Illness:  Patient is a 43 y.o. year old female who presents to the emergency department for evaluation of left lower quadrant abdominal pain for the past 4 days.  Patient reports she is currently taking Zepbound, states sometimes it causes her to have constipation, states she took 3 stool softeners yesterday and had several bowel movements, reports mild improvement in her symptoms after that.  Patient denies nausea or vomiting, denies fevers, denies urinary symptoms.  Patient states the pain worsens with movement or jarring.  Patient also reports area of redness in drainage in the right groin related to her hidradenitis suppurativa, states she takes Humira injections and is managed by dermatology.    IMPRESSION:     1. The kidneys are normal and nonobstructed.  2. Unopacified GI tract is grossly normal including the appendix.  3. Free fluid in the cul-de-sac, nonspecific but possibly physiologic.  4. Left inguinal adenopathy with a few prominent lymph nodes in the  right inguinal region. These are probably reactive. Additionally, there  is some skin thickening in the mons pubis. Cellulitis should be  excluded. No other adenopathy in the abdomen or pelvis.  5. Fat density ovoid lesion in the left adnexa, potentially an ovarian  dermoid. This can be further characterized with nonemergent pelvic  ultrasound.      Electronically Signed By-Dr. Modesto Miller MD On:2024     Also pt c/o itching spots on her legs, states when she has these in past her dermatologist would  give her a steroid injection     Subjective      Review of Systems:   Review of Systems   Constitutional:  Negative for fever.   Respiratory:  Negative for cough.    Cardiovascular:  Negative for chest pain.   Gastrointestinal:  Negative for abdominal pain, constipation, diarrhea, nausea and vomiting.   Genitourinary:  Negative for dysuria.      Breast - No tenderness, lumps, discharge, or blood from nipples.      Past Medical History, Social History, Family History and Care Team were all reviewed with patient and updated as appropriate.     Medications:     Current Outpatient Medications:     Adalimumab (Humira Pen) 40 MG/0.8ML Pen-injector Kit, Inject  under the skin into the appropriate area as directed Every 14 (Fourteen) Days. LAST DOSE 9-10-23, Disp: , Rfl:     Ashlyna 0.15-0.03 &0.01 MG, Take 1 tablet by mouth Daily., Disp: 90 tablet, Rfl: 3    ferrous sulfate 325 (65 FE) MG EC tablet, Take 1 tablet by mouth Daily With Breakfast., Disp: 90 tablet, Rfl: 1    ibuprofen (ADVIL,MOTRIN) 600 MG tablet, Take 1 tablet by mouth Every 8 (Eight) Hours As Needed for Moderate Pain., Disp: 30 tablet, Rfl: 0    lisinopril-hydrochlorothiazide (PRINZIDE,ZESTORETIC) 10-12.5 MG per tablet, Take 1 tablet by mouth Daily., Disp: 90 tablet, Rfl: 1    Tirzepatide-Weight Management (ZEPBOUND) 2.5 MG/0.5ML solution auto-injector, Inject 0.5 mL under the skin into the appropriate area as directed 1 (One) Time Per Week., Disp: 2 mL, Rfl: 2    Tirzepatide-Weight Management (ZEPBOUND) 7.5 MG/0.5ML solution auto-injector, Inject 0.5 mL under the skin into the appropriate area as directed 1 (One) Time Per Week., Disp: 2 mL, Rfl: 0    vitamin C (ASCORBIC ACID) 250 MG tablet, Take 1 tablet by mouth Daily., Disp: 90 tablet, Rfl: 1    vitamin D (ERGOCALCIFEROL) 1.25 MG (35207 UT) capsule capsule, Take 1 capsule by mouth 1 (One) Time Per Week., Disp: 13 capsule, Rfl: 1    Allergies:   Allergies   Allergen Reactions     "Sulfamethoxazole-Trimethoprim Hives               Objective     Physical Exam:  Vital Signs:   Vitals:    06/05/24 1018   BP: 100/68   Pulse: 73   Temp: 97.8 °F (36.6 °C)   SpO2: 100%   Weight: 86.2 kg (190 lb)   Height: 160 cm (63\")     Body mass index is 33.66 kg/m².           Physical Exam  Cardiovascular:      Rate and Rhythm: Normal rate and regular rhythm.      Heart sounds: Normal heart sounds. No murmur heard.  Pulmonary:      Effort: Pulmonary effort is normal.      Breath sounds: Normal breath sounds.   Abdominal:      General: Bowel sounds are normal.      Palpations: Abdomen is soft.      Tenderness: There is no abdominal tenderness.   Genitourinary:     General: Normal vulva.      Vagina: Vaginal discharge present.      Cervix: Normal.      Uterus: Normal.       Adnexa: Right adnexa normal and left adnexa normal.      Rectum: Normal.   Musculoskeletal:      Right lower leg: No edema.      Left lower leg: No edema.   Skin:     General: Skin is warm and dry.      Comments: Scattered scaly plaques bilateral lower extremities   Neurological:      Mental Status: She is alert.             Assessment / Plan      Assessment/Plan:   Diagnoses and all orders for this visit:    1. Encounter for well woman exam with routine gynecological exam (Primary)    2. Screening for malignant neoplasm of cervix  -     IgP, Aptima HPV    3. Left lower quadrant abdominal pain  -     US Non-ob Transvaginal; Future    4. Vaginal discharge  -     Gardnerella vaginalis, Trichomonas vaginalis, Candida albicans, DNA - Swab, Vagina    5. Dermatitis         Encounter for well woman exam pelvic exam completed Pap smear collected to screen for cervical cancer  Left lower quadrant pain abnormal CT abdomen pelvis will obtain ultrasound recommended by radiology  Vaginal discharge vaginal screen collected  Dermatitis will provide Depo-Medrol 80 in office      Follow Up:   Return in about 1 year (around 6/5/2025).    Healthcare Maintenance: " "  Counseling provided on [place] mammogram screening Pap smear screening colonoscopy  Pattie Antoine voices understanding and acceptance of this advice and will call back with any further questions or concerns. AVS with preventive healthcare tips printed for patient.     Joselito Hirsch, APRN    \"Please note that portions of this note were completed with a voice recognition program.\"    "

## 2024-06-10 LAB
CYTOLOGIST CVX/VAG CYTO: NORMAL
CYTOLOGY CVX/VAG DOC CYTO: NORMAL
CYTOLOGY CVX/VAG DOC THIN PREP: NORMAL
DX ICD CODE: NORMAL
HPV I/H RISK 4 DNA CVX QL PROBE+SIG AMP: NEGATIVE
Lab: NORMAL
Lab: NORMAL
OTHER STN SPEC: NORMAL
RECOM F/U CVX/VAG CYTO: NORMAL
STAT OF ADQ CVX/VAG CYTO-IMP: NORMAL

## 2024-07-11 RX ORDER — MULTIVIT WITH MINERALS/LUTEIN
250 TABLET ORAL DAILY
Qty: 90 TABLET | Refills: 1 | Status: SHIPPED | OUTPATIENT
Start: 2024-07-11

## 2024-07-11 RX ORDER — FERROUS SULFATE 325(65) MG
325 TABLET, DELAYED RELEASE (ENTERIC COATED) ORAL
Qty: 90 TABLET | Refills: 1 | Status: SHIPPED | OUTPATIENT
Start: 2024-07-11

## 2024-07-13 ENCOUNTER — HOSPITAL ENCOUNTER (EMERGENCY)
Facility: HOSPITAL | Age: 44
Discharge: HOME | End: 2024-07-13
Attending: EMERGENCY MEDICINE
Payer: COMMERCIAL

## 2024-07-13 VITALS
DIASTOLIC BLOOD PRESSURE: 48 MMHG | RESPIRATION RATE: 18 BRPM | SYSTOLIC BLOOD PRESSURE: 117 MMHG | OXYGEN SATURATION: 100 % | HEIGHT: 64 IN | TEMPERATURE: 99 F | WEIGHT: 186.29 LBS | HEART RATE: 100 BPM | BODY MASS INDEX: 31.8 KG/M2

## 2024-07-13 DIAGNOSIS — T78.3XXA ANGIOEDEMA OF LIPS, INITIAL ENCOUNTER: ICD-10-CM

## 2024-07-13 DIAGNOSIS — T78.3XXA ACE INHIBITOR-AGGRAVATED ANGIOEDEMA, INITIAL ENCOUNTER: Primary | ICD-10-CM

## 2024-07-13 DIAGNOSIS — T46.4X5A ACE INHIBITOR-AGGRAVATED ANGIOEDEMA, INITIAL ENCOUNTER: Primary | ICD-10-CM

## 2024-07-13 PROCEDURE — 63710000001 PREDNISONE PER 5 MG: Performed by: EMERGENCY MEDICINE

## 2024-07-13 PROCEDURE — 63710000001 DIPHENHYDRAMINE PER 50 MG: Performed by: EMERGENCY MEDICINE

## 2024-07-13 PROCEDURE — 99283 EMERGENCY DEPT VISIT LOW MDM: CPT

## 2024-07-13 RX ORDER — DIPHENHYDRAMINE HCL 25 MG
25 CAPSULE ORAL ONCE
Status: COMPLETED | OUTPATIENT
Start: 2024-07-13 | End: 2024-07-13

## 2024-07-13 RX ADMIN — PREDNISONE 60 MG: 50 TABLET ORAL at 12:58

## 2024-07-13 RX ADMIN — DIPHENHYDRAMINE HYDROCHLORIDE 25 MG: 25 CAPSULE ORAL at 12:57

## 2024-07-13 NOTE — DISCHARGE INSTRUCTIONS
The swelling of your upper lip is called angioedema, and is most commonly caused by medications like lisinopril (ACE inhibitor medications).    Typically your lip swelling should go down in a couple days if you just stop taking lisinopril.      Do not forget to call your doctors office Monday to let them know that you cannot take your lisinopril anymore, as you would need to be started on a different blood pressure medicine.    I would recommend never taking lisinopril again as any further episodes could be much more serious.    Please return to the ER ASAP if you start having any significant swelling of your tongue or inside your mouth or throat or difficulty breathing.

## 2024-07-13 NOTE — ED PROVIDER NOTES
Time: 12:54 PM EDT  Date of encounter:  2024  Independent Historian/Clinical History and Information was obtained by:   Patient and Family    History is limited by: N/A    Chief Complaint: Upper lip swelling, allergic reaction      History of Present Illness:  Patient is a 44 y.o. year old female with history of hypertension on lisinopril who presents to the emergency department for evaluation of acute onset of significant upper lip swelling.    She states she was fine when she went to bed and woke up this morning with significantly swollen upper lip only.    She does not have any lower lip swelling or any tongue or pharyngeal edema or any difficulty breathing or talking.    She specifically denied any new medications or new foods or seafood or any exposures.    She has been taking lisinopril for 1 year without issue.    HPI    Patient Care Team  Primary Care Provider: Joselito Hirsch APRN    Past Medical History:     Allergies   Allergen Reactions    Sulfamethoxazole-Trimethoprim Hives     Past Medical History:   Diagnosis Date    Allergic     Allergies     Anemia     Condition not found     Mass    Hidradenitis     Hyperlipidemia     Right groin wound      Past Surgical History:   Procedure Laterality Date    ABDOMINOPLASTY  2019    With flank liposuction     SECTION      HIDRADENITIS EXCISION      Drainage    INCISION AND DRAINAGE ABSCESS      INCISION AND DRAINAGE OF WOUND Bilateral 2023    Procedure: EXCISION OF HYDRAADENITIS RIGHT GROIN AND LEFT AXILLA;  Surgeon: Young Sanchez MD;  Location: [facility] [place] [facility] [place];  Service: General;  Laterality: Bilateral;    PILONIDAL CYSTECTOMY       Family History   Problem Relation Age of Onset    Kidney nephrosis Father     Kidney nephrosis Maternal Grandmother     Diabetes Other     Heart disease Other     Malig Hyperthermia Neg Hx        Home Medications:  Prior to Admission medications    Medication Sig Start Date End Date Taking?  "Authorizing Provider   Yahir 0.15-0.03 &0.01 MG Take 1 tablet by mouth Daily. 1/17/24   Joselito Hirsch APRN   ferrous sulfate 325 (65 FE) MG EC tablet TAKE 1 TABLET BY MOUTH DAILY WITH BREAKFAST 7/11/24   Joselito Hirsch APRN   Hyrimoz 40 MG/0.4ML solution auto-injector  5/10/24   Shellie Arevalo MD   ibuprofen (ADVIL,MOTRIN) 600 MG tablet Take 1 tablet by mouth Every 8 (Eight) Hours As Needed for Moderate Pain. 5/29/24   Elza Ospina APRN   lisinopril-hydrochlorothiazide (PRINZIDE,ZESTORETIC) 10-12.5 MG per tablet Take 1 tablet by mouth Daily. 4/18/24   Joselito Hirsch APRN   Tirzepatide-Weight Management (ZEPBOUND) 5 MG/0.5ML solution auto-injector Inject 0.5 mL under the skin into the appropriate area as directed 1 (One) Time Per Week. 7/5/24   Joselito Hirsch APRN   vitamin C (ASCORBIC ACID) 250 MG tablet TAKE 1 TABLET BY MOUTH DAILY 7/11/24   Joselito Hirsch APRN   vitamin D (ERGOCALCIFEROL) 1.25 MG (50441 UT) capsule capsule Take 1 capsule by mouth 1 (One) Time Per Week. 4/19/24   Joselito Hirsch APRN   Adalimumab (Humira Pen) 40 MG/0.8ML Pen-injector Kit Inject  under the skin into the appropriate area as directed Every 14 (Fourteen) Days. LAST DOSE 9-10-23  7/13/24  Provider, MD Shellie        Social History:   Social History     Tobacco Use    Smoking status: Never     Passive exposure: Never    Smokeless tobacco: Never   Vaping Use    Vaping status: Never Used   Substance Use Topics    Alcohol use: Never    Drug use: Never         Review of Systems:  Review of Systems   I performed a 10 point review of systems which was all negative, except for the positives found in the HPI above.      Physical Exam:  /48 (BP Location: Left arm, Patient Position: Sitting)   Pulse 100   Temp 99 °F (37.2 °C)   Resp 18   Ht 162.6 cm (64\")   Wt 84.5 kg (186 lb 4.6 oz)   LMP 04/25/2024 (Exact Date)   SpO2 100%   BMI 31.98 kg/m² "     Physical Exam   General: Awake alert and in no obvious distress    HEENT: Head normocephalic atraumatic, eyes PERRLA EOMI, nose normal, oropharynx looks normal but she does have moderate angioedema of the upper lip only, whereas her tongue and lower lip and face otherwise look normal and no difficulty talking or breathing.    Neck: Supple full range of motion, no meningismus, no lymphadenopathy    Heart: Regular rate and rhythm, no murmurs or rubs, 2+ radial pulses bilaterally    Lungs: Clear to auscultation bilaterally without wheezes or crackles, no respiratory distress    Abdomen: Soft, nontender, nondistended, no rebound or guarding    Skin: Warm, dry, no rash; no hives    Musculoskeletal: Normal range of motion, no lower extremity edema    Neurologic: Oriented x3, no motor deficits no sensory deficits    Psychiatric: Mood appears stable, no psychosis          Procedures:  Procedures      Medical Decision Making:      Comorbidities that affect care:    Hypertension    External Notes reviewed:    None      The following orders were placed and all results were independently analyzed by me:  No orders of the defined types were placed in this encounter.      Medications Given in the Emergency Department:  Medications   predniSONE (DELTASONE) tablet 60 mg (has no administration in time range)   diphenhydrAMINE (BENADRYL) capsule 25 mg (has no administration in time range)        ED Course:         Labs:    Lab Results (last 24 hours)       ** No results found for the last 24 hours. **             Imaging:    No Radiology Exams Resulted Within Past 24 Hours      Differential Diagnosis and Discussion:    Allergic Reaction: Differential diagnosis includes but is not limited to drug side effects, contact dermatitis, autoimmune conditions, infections, mast cell disorders, serum sickness, anaphylactoid reactions, angioedema, panic or anxiety attacks.        MDM           This patient is a pleasant 44-year-old female  with history of hypertension on lisinopril, who woke up this morning with significant upper lip swelling.    On physical exam she has moderately severe angioedema of the upper lip only, but no swelling of the tongue or posterior pharynx or any difficulty breathing or speaking.    All of her vital signs are within normal limits.    We talked about placing IV access but I do not think she has any significant indication for getting FFP or TXA or IV meds for angioedema as she looks so clinically well-appearing.    No signs of anaphylaxis seen.      She was also able to show me cell phone pictures of herself upon waking this morning and in my opinion, her lip swelling actually looks somewhat improved spontaneously throughout the day as compared to her pictures on her phone from this morning.        I am giving her a dose of oral Benadryl and steroid here and I will have her observed and closely monitor her swelling at home over the next 2 days and also discontinue her lisinopril and follow-up with her doctor for further blood pressure control.    She was given strict return precautions for any worsening swelling or any difficulty breathing.              Patient Care Considerations:          Consultants/Shared Management Plan:        Social Determinants of Health:    Patient is independent, reliable, and has access to care.       Disposition and Care Coordination:    Discharged: The patient is suitable and stable for discharge with no need for consideration of admission.    I have explained the patient´s condition, diagnoses and treatment plan based on the information available to me at this time. I have answered questions and addressed any concerns. The patient has a good  understanding of the patient´s diagnosis, condition, and treatment plan as can be expected at this point. The vital signs have been stable. The patient´s condition is stable and appropriate for discharge from the emergency department.      The patient  will pursue further outpatient evaluation with the primary care physician or other designated or consulting physician as outlined in the discharge instructions. They are agreeable to this plan of care and follow-up instructions have been explained in detail. The patient has received these instructions in written format and has expressed an understanding of the discharge instructions. The patient is aware that any significant change in condition or worsening of symptoms should prompt an immediate return to this or the closest emergency department or call to 911.  I have explained discharge medications and the need for follow up with the patient/caretakers. This was also printed in the discharge instructions. Patient was discharged with the following medications and follow up:      Medication List      No changes were made to your prescriptions during this visit.      Joselito Hirsch, APRN  [address] [place] [address]  [phone]    Call in 1 day  for a follow-up appointment       Final diagnoses:   ACE inhibitor-aggravated angioedema, initial encounter   Angioedema of lips, initial encounter        ED Disposition       ED Disposition   Discharge    Condition   Stable    Comment   --               This medical record created using voice recognition software.             Juan José Walters MD  07/13/24 1257

## 2024-07-17 PROBLEM — T46.4X5A ANGIOTENSIN CONVERTING ENZYME INHIBITOR-AGGRAVATED ANGIOEDEMA: Status: ACTIVE | Noted: 2024-07-17

## 2024-07-17 PROBLEM — T78.3XXA ANGIOTENSIN CONVERTING ENZYME INHIBITOR-AGGRAVATED ANGIOEDEMA: Status: ACTIVE | Noted: 2024-07-17

## 2024-07-17 NOTE — PROGRESS NOTES
ACUTE VISIT     Patient Name: Pattie Antoine  : 1980   MRN: [id number]     Chief Complaint:    Chief Complaint   Patient presents with    Allergic Reaction    ER follow up       History of Present Illness: Pattie Antoine is a 44 y.o. female who is here today for allergic reaction to Lisinopril.  Patient has been taking Lisinopril/HCTZ 10-12.5mg    Patient presented to  and ER 24-  Patient is a 44 y.o. year old female with history of hypertension on lisinopril who presents to the emergency department for evaluation of acute onset of significant upper lip swelling.     She states she was fine when she went to bed and woke up this morning with significantly swollen upper lip only.     She does not have any lower lip swelling or any tongue or pharyngeal edema or any difficulty breathing or talking.     She specifically denied any new medications or new foods or seafood or any exposures.     She has been taking lisinopril for 1 year without issue.    Patient was treated with Benedryl and Prednisone.    Once she stopped the Lisinopril Friday, symptoms subsided and have not returned.            Subjective      Review of Systems:   Review of Systems   Constitutional:  Negative for fever.   Respiratory:  Negative for cough and shortness of breath.    Cardiovascular:  Negative for chest pain.   Neurological:  Negative for dizziness and headaches.        Past Medical History:   Past Medical History:   Diagnosis Date    Allergic     Allergies     Anemia     Condition not found     Mass    Hidradenitis     Hyperlipidemia     Right groin wound        Past Surgical History:   Past Surgical History:   Procedure Laterality Date    ABDOMINOPLASTY  2019    With flank liposuction     SECTION      HIDRADENITIS EXCISION      Drainage    INCISION AND DRAINAGE ABSCESS      INCISION AND DRAINAGE OF WOUND Bilateral 2023    Procedure: EXCISION OF HYDRAADENITIS RIGHT GROIN AND LEFT AXILLA;  Surgeon:  Young Sanchez MD;  [place] [facility] [place] [facility] [place]  Service: General;  Laterality: Bilateral;    PILONIDAL CYSTECTOMY         Family History:   Family History   Problem Relation Age of Onset    Kidney nephrosis Father     Kidney nephrosis Maternal Grandmother     Diabetes Other     Heart disease Other     Malig Hyperthermia Neg Hx        Social History:   Social History     Socioeconomic History    Marital status:    Tobacco Use    Smoking status: Never     Passive exposure: Never    Smokeless tobacco: Never   Vaping Use    Vaping status: Never Used   Substance and Sexual Activity    Alcohol use: Never    Drug use: Never    Sexual activity: Defer       Medications:     Current Outpatient Medications:     Ashlyna 0.15-0.03 &0.01 MG, Take 1 tablet by mouth Daily., Disp: 90 tablet, Rfl: 3    ferrous sulfate 325 (65 FE) MG EC tablet, TAKE 1 TABLET BY MOUTH DAILY WITH BREAKFAST, Disp: 90 tablet, Rfl: 1    Hyrimoz 40 MG/0.4ML solution auto-injector, , Disp: , Rfl:     ibuprofen (ADVIL,MOTRIN) 600 MG tablet, Take 1 tablet by mouth Every 8 (Eight) Hours As Needed for Moderate Pain., Disp: 30 tablet, Rfl: 0    Tirzepatide-Weight Management (ZEPBOUND) 5 MG/0.5ML solution auto-injector, Inject 0.5 mL under the skin into the appropriate area as directed 1 (One) Time Per Week., Disp: 2 mL, Rfl: 0    vitamin C (ASCORBIC ACID) 250 MG tablet, TAKE 1 TABLET BY MOUTH DAILY, Disp: 90 tablet, Rfl: 1    vitamin D (ERGOCALCIFEROL) 1.25 MG (13328 UT) capsule capsule, Take 1 capsule by mouth 1 (One) Time Per Week., Disp: 13 capsule, Rfl: 1    amLODIPine (NORVASC) 5 MG tablet, Take 1 tablet by mouth Daily., Disp: 30 tablet, Rfl: 2    lisinopril-hydrochlorothiazide (PRINZIDE,ZESTORETIC) 10-12.5 MG per tablet, Take 1 tablet by mouth Daily. (Patient not taking: Reported on 7/18/2024), Disp: 90 tablet, Rfl: 1    Allergies:   Allergies   Allergen Reactions    Sulfamethoxazole-Trimethoprim Hives    Lisinopril Hives         Objective  "    Physical Exam:  Vital Signs:   Vitals:    07/18/24 1419   BP: 119/82   Pulse: 82   Temp: 98.7 °F (37.1 °C)   SpO2: 98%   Weight: 86.2 kg (190 lb)   Height: 162.6 cm (64\")     Body mass index is 32.61 kg/m².     Physical Exam  Cardiovascular:      Rate and Rhythm: Normal rate and regular rhythm.      Heart sounds: Normal heart sounds. No murmur heard.  Pulmonary:      Effort: Pulmonary effort is normal.      Breath sounds: Normal breath sounds.   Neurological:      Mental Status: She is alert.             Assessment / Plan      Assessment/Plan:   Diagnoses and all orders for this visit:    1. Primary hypertension (Primary)    2. Angiotensin converting enzyme inhibitor-aggravated angioedema, subsequent encounter    Other orders  -     amLODIPine (NORVASC) 5 MG tablet; Take 1 tablet by mouth Daily.  Dispense: 30 tablet; Refill: 2         Hypertension with angioedema on ACE inhibitor lisinopril hydrochlorothiazide.  Will start amlodipine with a blood pressure check in 2 weeks keep follow-up in 3 months recommend exercise 30 minutes daily reducing salt intake and weight loss      Follow Up:   Return in about 3 months (around 10/18/2024).    DOMINGO Bob      Please note that portions of this note were completed with a voice recognition program.  "

## 2024-07-18 ENCOUNTER — OFFICE VISIT (OUTPATIENT)
Dept: FAMILY MEDICINE CLINIC | Facility: CLINIC | Age: 44
End: 2024-07-18
Payer: COMMERCIAL

## 2024-07-18 VITALS
OXYGEN SATURATION: 98 % | DIASTOLIC BLOOD PRESSURE: 82 MMHG | SYSTOLIC BLOOD PRESSURE: 119 MMHG | BODY MASS INDEX: 32.44 KG/M2 | TEMPERATURE: 98.7 F | HEIGHT: 64 IN | HEART RATE: 82 BPM | WEIGHT: 190 LBS

## 2024-07-18 DIAGNOSIS — I10 PRIMARY HYPERTENSION: Primary | ICD-10-CM

## 2024-07-18 DIAGNOSIS — T46.4X5D ANGIOTENSIN CONVERTING ENZYME INHIBITOR-AGGRAVATED ANGIOEDEMA, SUBSEQUENT ENCOUNTER: ICD-10-CM

## 2024-07-18 DIAGNOSIS — T78.3XXD ANGIOTENSIN CONVERTING ENZYME INHIBITOR-AGGRAVATED ANGIOEDEMA, SUBSEQUENT ENCOUNTER: ICD-10-CM

## 2024-07-18 PROCEDURE — 99213 OFFICE O/P EST LOW 20 MIN: CPT | Performed by: NURSE PRACTITIONER

## 2024-07-18 RX ORDER — AMLODIPINE BESYLATE 5 MG/1
5 TABLET ORAL DAILY
Qty: 30 TABLET | Refills: 2 | Status: SHIPPED | OUTPATIENT
Start: 2024-07-18

## 2024-08-05 ENCOUNTER — CLINICAL SUPPORT (OUTPATIENT)
Dept: FAMILY MEDICINE CLINIC | Facility: CLINIC | Age: 44
End: 2024-08-05
Payer: COMMERCIAL

## 2024-08-05 ENCOUNTER — TELEPHONE (OUTPATIENT)
Dept: FAMILY MEDICINE CLINIC | Facility: CLINIC | Age: 44
End: 2024-08-05

## 2024-08-05 VITALS — SYSTOLIC BLOOD PRESSURE: 118 MMHG | DIASTOLIC BLOOD PRESSURE: 77 MMHG

## 2024-08-05 DIAGNOSIS — I10 PRIMARY HYPERTENSION: Primary | ICD-10-CM

## 2024-08-05 NOTE — TELEPHONE ENCOUNTER
Pt presented for a blood pressure check.   Pt started new medication a few weeks ago.  Her b/p is 118/77  when checking it today

## 2024-09-04 DIAGNOSIS — E66.09 CLASS 1 OBESITY DUE TO EXCESS CALORIES WITH SERIOUS COMORBIDITY AND BODY MASS INDEX (BMI) OF 32.0 TO 32.9 IN ADULT: Primary | ICD-10-CM

## 2024-10-24 PROBLEM — E55.9 VITAMIN D DEFICIENCY: Status: ACTIVE | Noted: 2024-10-24

## 2024-10-24 NOTE — PROGRESS NOTES
Follow Up Office Visit      Patient Name: Pattie Antoine  : 1980   MRN: [id number]     Chief Complaint: HTN, hyperlipidemia, anemia, CKD.         History of Present Illness: Pattie Antoine is a 44 y.o. female who is here today to follow up  for HTN, hyperlipidemia, anemia, CKD.     Follow up increased dose of  zepbound to 10 mg once weekly   Will start 12.5 mg next week   Weight loss Since April 33 lbs     Labs-2024  Pap-2024   Mammogram-2024      Pt c/o flaky crusting of scalp tried prescribed by daughters dermatologist works well       Subjective      Review of Systems:   Review of Systems   Constitutional:  Negative for fever.   HENT:  Negative for ear pain and sore throat.    Respiratory:  Negative for cough.    Cardiovascular:  Negative for chest pain.   Gastrointestinal:  Negative for abdominal pain, constipation, diarrhea, nausea and vomiting.   Genitourinary:  Negative for dysuria.   Musculoskeletal:  Negative for myalgias.        Past Medical History:   Past Medical History:   Diagnosis Date    Allergic     Allergies     Anemia     Condition not found     Mass    Hidradenitis     Hyperlipidemia     Right groin wound        Past Surgical History:   Past Surgical History:   Procedure Laterality Date    ABDOMINOPLASTY  2019    With flank liposuction     SECTION      HIDRADENITIS EXCISION      Drainage    INCISION AND DRAINAGE ABSCESS      INCISION AND DRAINAGE OF WOUND Bilateral 2023    Procedure: EXCISION OF HYDRAADENITIS RIGHT GROIN AND LEFT AXILLA;  Surgeon: Young Sanchez MD;  [place] [facility] [place] [facility] [place]  Service: General;  Laterality: Bilateral;    PILONIDAL CYSTECTOMY         Family History:   Family History   Problem Relation Age of Onset    Kidney nephrosis Father     Kidney nephrosis Maternal Grandmother     Diabetes Other     Heart disease Other     Malig Hyperthermia Neg Hx        Social History:   Social History     Socioeconomic History    Marital status:  "   Tobacco Use    Smoking status: Never     Passive exposure: Never    Smokeless tobacco: Never   Vaping Use    Vaping status: Never Used   Substance and Sexual Activity    Alcohol use: Never    Drug use: Never    Sexual activity: Defer       Medications:     Current Outpatient Medications:     amLODIPine (NORVASC) 5 MG tablet, Take 1 tablet by mouth Daily., Disp: 90 tablet, Rfl: 1    Ashlyna 0.15-0.03 &0.01 MG, Take 1 tablet by mouth Daily., Disp: 90 tablet, Rfl: 3    ferrous sulfate 325 (65 FE) MG EC tablet, TAKE 1 TABLET BY MOUTH DAILY WITH BREAKFAST, Disp: 90 tablet, Rfl: 1    Hyrimoz 40 MG/0.4ML solution auto-injector, , Disp: , Rfl:     ibuprofen (ADVIL,MOTRIN) 600 MG tablet, Take 1 tablet by mouth Every 8 (Eight) Hours As Needed for Moderate Pain., Disp: 30 tablet, Rfl: 0    Tirzepatide-Weight Management (ZEPBOUND) 12.5 MG/0.5ML solution auto-injector, Inject 0.5 mL under the skin into the appropriate area as directed 1 (One) Time Per Week., Disp: 2 mL, Rfl: 0    vitamin C (ASCORBIC ACID) 250 MG tablet, TAKE 1 TABLET BY MOUTH DAILY, Disp: 90 tablet, Rfl: 1    vitamin D (ERGOCALCIFEROL) 1.25 MG (75104 UT) capsule capsule, Take 1 capsule by mouth 1 (One) Time Per Week., Disp: 13 capsule, Rfl: 1    Fluocinolone Acetonide Scalp 0.01 % oil, Apply 1 Application topically Daily., Disp: 118.28 mL, Rfl: 5    Allergies:   Allergies   Allergen Reactions    Sulfamethoxazole-Trimethoprim Hives    Lisinopril Hives           Objective     Physical Exam:  Vital Signs:   Vitals:    10/25/24 1323   BP: 109/77   Pulse: 92   Temp: 98.3 °F (36.8 °C)   SpO2: 100%   Weight: 76.2 kg (168 lb)   Height: 162.6 cm (64\")     Body mass index is 28.84 kg/m².           Physical Exam  HENT:      Right Ear: Tympanic membrane normal.      Left Ear: Tympanic membrane normal.      Nose: Nose normal.      Mouth/Throat:      Mouth: Mucous membranes are moist.   Eyes:      Conjunctiva/sclera: Conjunctivae normal.   Neck:      Thyroid: No " thyroid tenderness.      Vascular: No carotid bruit.   Cardiovascular:      Rate and Rhythm: Normal rate and regular rhythm.      Heart sounds: Normal heart sounds. No murmur heard.  Pulmonary:      Effort: Pulmonary effort is normal.      Breath sounds: Normal breath sounds.   Abdominal:      General: Bowel sounds are normal.      Palpations: Abdomen is soft.   Musculoskeletal:      Right lower leg: No edema.      Left lower leg: No edema.   Skin:     General: Skin is warm and dry.   Neurological:      Mental Status: She is alert.   Psychiatric:         Mood and Affect: Mood normal.         Behavior: Behavior normal.             Assessment / Plan      Assessment/Plan:   Diagnoses and all orders for this visit:    1. Primary hypertension (Primary)  -     Microalbumin / Creatinine Urine Ratio - Urine, Clean Catch    2. Mixed hyperlipidemia  -     Lipid Panel    3. Stage 1 chronic kidney disease    4. Impaired fasting blood sugar  -     Comprehensive Metabolic Panel  -     Hemoglobin A1c  -     Urinalysis With Culture If Indicated -    5. Vitamin D deficiency  -     Vitamin D,25-Hydroxy    6. Anemia, unspecified type  -     CBC Auto Differential  -     Iron Profile  -     Ferritin  -     Vitamin B12    7. Hidradenitis suppurativa    8. Overweight (BMI 25.0-29.9)    9. Scalp psoriasis    10. Screening for thyroid disorder  -     TSH    11. Screening mammogram for breast cancer  -     Mammo Screening Digital Tomosynthesis Bilateral With CAD; Future    Other orders  -     amLODIPine (NORVASC) 5 MG tablet; Take 1 tablet by mouth Daily.  Dispense: 90 tablet; Refill: 1  -     Fluocinolone Acetonide Scalp 0.01 % oil; Apply 1 Application topically Daily.  Dispense: 118.28 mL; Refill: 5  -     vitamin D (ERGOCALCIFEROL) 1.25 MG (50237 UT) capsule capsule; Take 1 capsule by mouth 1 (One) Time Per Week.  Dispense: 13 capsule; Refill: 1       HTN currently controlled with amlodipine will provide refills  Hyperlipidemia will  "obtain lipid panel to monitor, she is actively losing weight  Chronic kidney disease stage I will obtain CMP to monitor do recommend avoid all NSAIDs and increasing water intake  Impaired fasting glucose will obtain hemoglobin A1c to monitor patient is avoiding added carbs sugars active 30 minutes daily and is actively losing weight  Vitamin D deficiency will continue once weekly 50,000 units obtain labs to monitor  Anemia pain-patient is currently taking ferrous sulfate and vitamin C will obtain labs to monitor for further instructions  Hydradenitis suppurativa  - Currently on adalimumab q 80 q 2 weeks . since 5 yrs per dermatology   Overweight currently on Zepbound total weight loss of 33 pounds in the past 6 months we will advance to 12.5 mg once weekly next month  Scalp psoriasis will treat with fluocinolone scalp oil    Follow Up:   Return in about 6 months (around 4/25/2025).    Davi Hirsch, DOMINGO    \"Please note that portions of this note were completed with a voice recognition program.\"    "

## 2024-10-25 ENCOUNTER — OFFICE VISIT (OUTPATIENT)
Dept: FAMILY MEDICINE CLINIC | Facility: CLINIC | Age: 44
End: 2024-10-25
Payer: COMMERCIAL

## 2024-10-25 ENCOUNTER — LAB (OUTPATIENT)
Dept: LAB | Facility: HOSPITAL | Age: 44
End: 2024-10-25
Payer: COMMERCIAL

## 2024-10-25 VITALS
SYSTOLIC BLOOD PRESSURE: 109 MMHG | HEIGHT: 64 IN | WEIGHT: 168 LBS | TEMPERATURE: 98.3 F | BODY MASS INDEX: 28.68 KG/M2 | HEART RATE: 92 BPM | DIASTOLIC BLOOD PRESSURE: 77 MMHG | OXYGEN SATURATION: 100 %

## 2024-10-25 DIAGNOSIS — L40.9 SCALP PSORIASIS: ICD-10-CM

## 2024-10-25 DIAGNOSIS — Z13.29 SCREENING FOR THYROID DISORDER: ICD-10-CM

## 2024-10-25 DIAGNOSIS — Z12.31 SCREENING MAMMOGRAM FOR BREAST CANCER: ICD-10-CM

## 2024-10-25 DIAGNOSIS — I10 PRIMARY HYPERTENSION: Primary | ICD-10-CM

## 2024-10-25 DIAGNOSIS — L73.2 HIDRADENITIS SUPPURATIVA: ICD-10-CM

## 2024-10-25 DIAGNOSIS — N18.1 STAGE 1 CHRONIC KIDNEY DISEASE: ICD-10-CM

## 2024-10-25 DIAGNOSIS — E66.3 OVERWEIGHT (BMI 25.0-29.9): ICD-10-CM

## 2024-10-25 DIAGNOSIS — D64.9 ANEMIA, UNSPECIFIED TYPE: ICD-10-CM

## 2024-10-25 DIAGNOSIS — E78.2 MIXED HYPERLIPIDEMIA: ICD-10-CM

## 2024-10-25 DIAGNOSIS — E55.9 VITAMIN D DEFICIENCY: ICD-10-CM

## 2024-10-25 DIAGNOSIS — R73.01 IMPAIRED FASTING BLOOD SUGAR: ICD-10-CM

## 2024-10-25 LAB
25(OH)D3 SERPL-MCNC: 49.7 NG/ML (ref 30–100)
ALBUMIN SERPL-MCNC: 4.2 G/DL (ref 3.5–5.2)
ALBUMIN UR-MCNC: 2.7 MG/DL
ALBUMIN/GLOB SERPL: 0.9 G/DL
ALP SERPL-CCNC: 61 U/L (ref 39–117)
ALT SERPL W P-5'-P-CCNC: 9 U/L (ref 1–33)
ANION GAP SERPL CALCULATED.3IONS-SCNC: 10.4 MMOL/L (ref 5–15)
AST SERPL-CCNC: 9 U/L (ref 1–32)
BACTERIA UR QL AUTO: ABNORMAL /HPF
BASOPHILS # BLD AUTO: 0.06 10*3/MM3 (ref 0–0.2)
BASOPHILS NFR BLD AUTO: 0.7 % (ref 0–1.5)
BILIRUB SERPL-MCNC: 0.3 MG/DL (ref 0–1.2)
BILIRUB UR QL STRIP: NEGATIVE
BUN SERPL-MCNC: 11 MG/DL (ref 6–20)
BUN/CREAT SERPL: 10.4 (ref 7–25)
CALCIUM SPEC-SCNC: 9.9 MG/DL (ref 8.6–10.5)
CHLORIDE SERPL-SCNC: 105 MMOL/L (ref 98–107)
CHOLEST SERPL-MCNC: 219 MG/DL (ref 0–200)
CLARITY UR: CLEAR
CO2 SERPL-SCNC: 22.6 MMOL/L (ref 22–29)
COLOR UR: YELLOW
CREAT SERPL-MCNC: 1.06 MG/DL (ref 0.57–1)
CREAT UR-MCNC: 381.3 MG/DL
DEPRECATED RDW RBC AUTO: 39 FL (ref 37–54)
EGFRCR SERPLBLD CKD-EPI 2021: 67.4 ML/MIN/1.73
EOSINOPHIL # BLD AUTO: 0.13 10*3/MM3 (ref 0–0.4)
EOSINOPHIL NFR BLD AUTO: 1.6 % (ref 0.3–6.2)
ERYTHROCYTE [DISTWIDTH] IN BLOOD BY AUTOMATED COUNT: 12.4 % (ref 12.3–15.4)
FERRITIN SERPL-MCNC: 152 NG/ML (ref 13–150)
GLOBULIN UR ELPH-MCNC: 4.8 GM/DL
GLUCOSE SERPL-MCNC: 76 MG/DL (ref 65–99)
GLUCOSE UR STRIP-MCNC: NEGATIVE MG/DL
HBA1C MFR BLD: 5.2 % (ref 4.8–5.6)
HCT VFR BLD AUTO: 34.2 % (ref 34–46.6)
HDLC SERPL-MCNC: 34 MG/DL (ref 40–60)
HGB BLD-MCNC: 11.1 G/DL (ref 12–15.9)
HGB UR QL STRIP.AUTO: NEGATIVE
HOLD SPECIMEN: NORMAL
HYALINE CASTS UR QL AUTO: ABNORMAL /LPF
IMM GRANULOCYTES # BLD AUTO: 0.02 10*3/MM3 (ref 0–0.05)
IMM GRANULOCYTES NFR BLD AUTO: 0.2 % (ref 0–0.5)
IRON 24H UR-MRATE: 85 MCG/DL (ref 37–145)
IRON SATN MFR SERPL: 27 % (ref 20–50)
KETONES UR QL STRIP: ABNORMAL
LDLC SERPL CALC-MCNC: 174 MG/DL (ref 0–100)
LDLC/HDLC SERPL: 5.06 {RATIO}
LEUKOCYTE ESTERASE UR QL STRIP.AUTO: ABNORMAL
LYMPHOCYTES # BLD AUTO: 3.64 10*3/MM3 (ref 0.7–3.1)
LYMPHOCYTES NFR BLD AUTO: 45.1 % (ref 19.6–45.3)
MCH RBC QN AUTO: 28 PG (ref 26.6–33)
MCHC RBC AUTO-ENTMCNC: 32.5 G/DL (ref 31.5–35.7)
MCV RBC AUTO: 86.1 FL (ref 79–97)
MICROALBUMIN/CREAT UR: 7.1 MG/G (ref 0–29)
MONOCYTES # BLD AUTO: 0.47 10*3/MM3 (ref 0.1–0.9)
MONOCYTES NFR BLD AUTO: 5.8 % (ref 5–12)
NEUTROPHILS NFR BLD AUTO: 3.75 10*3/MM3 (ref 1.7–7)
NEUTROPHILS NFR BLD AUTO: 46.6 % (ref 42.7–76)
NITRITE UR QL STRIP: NEGATIVE
NRBC BLD AUTO-RTO: 0 /100 WBC (ref 0–0.2)
PH UR STRIP.AUTO: 6 [PH] (ref 5–8)
PLATELET # BLD AUTO: 374 10*3/MM3 (ref 140–450)
PMV BLD AUTO: 9.9 FL (ref 6–12)
POTASSIUM SERPL-SCNC: 4.1 MMOL/L (ref 3.5–5.2)
PROT SERPL-MCNC: 9 G/DL (ref 6–8.5)
PROT UR QL STRIP: ABNORMAL
RBC # BLD AUTO: 3.97 10*6/MM3 (ref 3.77–5.28)
RBC # UR STRIP: ABNORMAL /HPF
REF LAB TEST METHOD: ABNORMAL
SODIUM SERPL-SCNC: 138 MMOL/L (ref 136–145)
SP GR UR STRIP: 1.03 (ref 1–1.03)
SQUAMOUS #/AREA URNS HPF: ABNORMAL /HPF
TIBC SERPL-MCNC: 313 MCG/DL (ref 298–536)
TRANSFERRIN SERPL-MCNC: 210 MG/DL (ref 200–360)
TRIGL SERPL-MCNC: 64 MG/DL (ref 0–150)
TSH SERPL DL<=0.05 MIU/L-ACNC: 1.01 UIU/ML (ref 0.27–4.2)
UROBILINOGEN UR QL STRIP: ABNORMAL
VIT B12 BLD-MCNC: 464 PG/ML (ref 211–946)
VLDLC SERPL-MCNC: 11 MG/DL (ref 5–40)
WBC # UR STRIP: ABNORMAL /HPF
WBC NRBC COR # BLD AUTO: 8.07 10*3/MM3 (ref 3.4–10.8)

## 2024-10-25 PROCEDURE — 80061 LIPID PANEL: CPT | Performed by: NURSE PRACTITIONER

## 2024-10-25 PROCEDURE — 82728 ASSAY OF FERRITIN: CPT | Performed by: NURSE PRACTITIONER

## 2024-10-25 PROCEDURE — 82043 UR ALBUMIN QUANTITATIVE: CPT | Performed by: NURSE PRACTITIONER

## 2024-10-25 PROCEDURE — 83540 ASSAY OF IRON: CPT | Performed by: NURSE PRACTITIONER

## 2024-10-25 PROCEDURE — 83036 HEMOGLOBIN GLYCOSYLATED A1C: CPT | Performed by: NURSE PRACTITIONER

## 2024-10-25 PROCEDURE — 81001 URINALYSIS AUTO W/SCOPE: CPT | Performed by: NURSE PRACTITIONER

## 2024-10-25 PROCEDURE — 82607 VITAMIN B-12: CPT | Performed by: NURSE PRACTITIONER

## 2024-10-25 PROCEDURE — 82570 ASSAY OF URINE CREATININE: CPT | Performed by: NURSE PRACTITIONER

## 2024-10-25 PROCEDURE — 99214 OFFICE O/P EST MOD 30 MIN: CPT | Performed by: NURSE PRACTITIONER

## 2024-10-25 PROCEDURE — 80050 GENERAL HEALTH PANEL: CPT | Performed by: NURSE PRACTITIONER

## 2024-10-25 PROCEDURE — 84466 ASSAY OF TRANSFERRIN: CPT | Performed by: NURSE PRACTITIONER

## 2024-10-25 PROCEDURE — 82306 VITAMIN D 25 HYDROXY: CPT | Performed by: NURSE PRACTITIONER

## 2024-10-25 RX ORDER — ERGOCALCIFEROL 1.25 MG/1
50000 CAPSULE, LIQUID FILLED ORAL WEEKLY
Qty: 13 CAPSULE | Refills: 1 | Status: CANCELLED | OUTPATIENT
Start: 2024-10-25

## 2024-10-25 RX ORDER — FERROUS SULFATE 325(65) MG
325 TABLET, DELAYED RELEASE (ENTERIC COATED) ORAL
Qty: 90 TABLET | Refills: 1 | Status: CANCELLED | OUTPATIENT
Start: 2024-10-25

## 2024-10-25 RX ORDER — ERGOCALCIFEROL 1.25 MG/1
50000 CAPSULE, LIQUID FILLED ORAL WEEKLY
Qty: 13 CAPSULE | Refills: 1 | Status: SHIPPED | OUTPATIENT
Start: 2024-10-25

## 2024-10-25 RX ORDER — AMLODIPINE BESYLATE 5 MG/1
5 TABLET ORAL DAILY
Qty: 90 TABLET | Refills: 1 | Status: SHIPPED | OUTPATIENT
Start: 2024-10-25

## 2024-10-25 RX ORDER — FLUOCINOLONE ACETONIDE 0.11 MG/ML
1 OIL TOPICAL DAILY
Qty: 118.28 ML | Refills: 5 | Status: SHIPPED | OUTPATIENT
Start: 2024-10-25

## 2024-10-25 RX ORDER — LEVONORGESTREL AND ETHINYL ESTRADIOL AND ETHINYL ESTRADIOL 150-30(84)
1 KIT ORAL DAILY
Qty: 90 TABLET | Refills: 3 | Status: CANCELLED | OUTPATIENT
Start: 2024-10-25

## 2024-10-25 RX ORDER — MULTIVIT WITH MINERALS/LUTEIN
250 TABLET ORAL DAILY
Qty: 90 TABLET | Refills: 1 | Status: CANCELLED | OUTPATIENT
Start: 2024-10-25

## 2024-11-20 DIAGNOSIS — E66.09 CLASS 1 OBESITY DUE TO EXCESS CALORIES WITH SERIOUS COMORBIDITY AND BODY MASS INDEX (BMI) OF 32.0 TO 32.9 IN ADULT: ICD-10-CM

## 2024-11-20 DIAGNOSIS — E66.811 CLASS 1 OBESITY DUE TO EXCESS CALORIES WITH SERIOUS COMORBIDITY AND BODY MASS INDEX (BMI) OF 32.0 TO 32.9 IN ADULT: ICD-10-CM

## 2024-12-10 DIAGNOSIS — E66.09 CLASS 1 OBESITY DUE TO EXCESS CALORIES WITH SERIOUS COMORBIDITY AND BODY MASS INDEX (BMI) OF 32.0 TO 32.9 IN ADULT: ICD-10-CM

## 2024-12-10 DIAGNOSIS — E66.811 CLASS 1 OBESITY DUE TO EXCESS CALORIES WITH SERIOUS COMORBIDITY AND BODY MASS INDEX (BMI) OF 32.0 TO 32.9 IN ADULT: ICD-10-CM

## 2025-02-01 ENCOUNTER — HOSPITAL ENCOUNTER (OUTPATIENT)
Dept: MAMMOGRAPHY | Facility: HOSPITAL | Age: 45
Discharge: HOME | End: 2025-02-01
Admitting: NURSE PRACTITIONER
Payer: COMMERCIAL

## 2025-02-01 DIAGNOSIS — Z12.31 SCREENING MAMMOGRAM FOR BREAST CANCER: ICD-10-CM

## 2025-02-01 PROCEDURE — 77067 SCR MAMMO BI INCL CAD: CPT

## 2025-02-01 PROCEDURE — 77063 BREAST TOMOSYNTHESIS BI: CPT

## 2025-02-11 RX ORDER — LEVONORGESTREL AND ETHINYL ESTRADIOL AND ETHINYL ESTRADIOL 150-30(84)
1 KIT ORAL DAILY
Qty: 90 TABLET | Refills: 3 | Status: SHIPPED | OUTPATIENT
Start: 2025-02-11

## 2025-02-13 RX ORDER — LEVONORGESTREL AND ETHINYL ESTRADIOL AND ETHINYL ESTRADIOL 150-30(84)
1 KIT ORAL DAILY
Qty: 90 TABLET | Refills: 3 | OUTPATIENT
Start: 2025-02-13

## 2025-02-14 ENCOUNTER — TELEPHONE (OUTPATIENT)
Dept: FAMILY MEDICINE CLINIC | Facility: CLINIC | Age: 45
End: 2025-02-14
Payer: COMMERCIAL

## 2025-02-14 NOTE — TELEPHONE ENCOUNTER
Pt calling office in regards to medication denial of ashlyna and zepbound. Pt is requesting call back [phone]

## 2025-02-17 NOTE — TELEPHONE ENCOUNTER
Spoke with Pattie this morning and did inform her that both medications were sent to [facility] on 02/11/2025. She will call and speak with pharmacy. It looks as though 2 refill requests were sent days apart.

## 2025-03-18 ENCOUNTER — TELEPHONE (OUTPATIENT)
Dept: FAMILY MEDICINE CLINIC | Facility: CLINIC | Age: 45
End: 2025-03-18
Payer: COMMERCIAL

## 2025-03-18 NOTE — TELEPHONE ENCOUNTER
Caller: Pattie Antoine  Relationship: self  Best call back number: [phone]  Who are you requesting to speak with (clinical staff, provider,  specific staff member): Clinical staff  What was the call regarding    Pt received letter stating medication Tirzepatide-Weight Management (ZEPBOUND) 10 MG/0.5ML solution auto-injector  requires a PA    Please Advise

## 2025-05-05 RX ORDER — AMLODIPINE BESYLATE 5 MG/1
5 TABLET ORAL DAILY
Qty: 90 TABLET | Refills: 1 | Status: SHIPPED | OUTPATIENT
Start: 2025-05-05

## 2025-06-05 NOTE — PROGRESS NOTES
Patient Name: Pattie Antoine  : 1980   MRN: [id number]     Chief Complaint:  ANNUAL PHYSICAL     History of Present Illness: Pattie Antoine is a 44 y.o. female who is here today for annual physical  also follow up for  HTN, hyperlipidemia, anemia, CKD.     B/P pt has not been check recently       Patient states she is having pain in her mouth pain level 7. Started 2 days ago also jaw pain.  Also sinus congestion, ear pain, cough, sinus drainage or a week     Last dental cleaning 2-3 weeks prior was not having pain at this time     Follow up   zepbound 10 mg once weekly   Weight loss 13lbs  in 8 months  Starting weight 201 lbs   Total weight loss 46 lbs   pt reports insurance will no longer covering zepbound, will cover wegovy    Pap-2024   Mammogram-2025       Consult with nephrology per progress note 2024   GFR category is:‡ G1   ACR category is:** A1   CKD classification is: G1/A1   Risk of progression is: Low   Frequency of monitoring should be: Once a year   Referral to a nephrologist is: Not needed     Hypertension   - Continue current regimen including lisinopril-hydroCHLOROthiazide.  - Low sodium chloride intake < 2 gr recommended and encourage  - Lifestyle modification including regular exercise and dietary recommendations discussed  - The goal for the blood pressure less than 130/80, discussed   - Instruction about salt restriction, discussed     Hydradenitis suppurativa  - Currently on adalimumab q 80 q 2 weeks . since 5 yrs     Plan   - patient with normal renal function based on cystatin C , UA and UPCR negative   - Based on cystatin C   CKD-EPI Cystatin C (2012)   What is the patient’s ACR?† <30 mg/g       Subjective      Review of Systems:   Review of Systems   Constitutional:  Negative for fever.   HENT:  Positive for congestion, ear pain, postnasal drip, rhinorrhea, sinus pressure and sinus pain.    Eyes:  Negative for discharge.   Respiratory:  Positive for cough.     Cardiovascular:  Negative for chest pain.   Gastrointestinal:  Negative for abdominal pain.   Genitourinary:  Negative for dysuria.        Past Medical History:   Past Medical History:   Diagnosis Date    Allergic     Allergies     Anemia     Condition not found     Mass    Hidradenitis     Hyperlipidemia     Right groin wound        Past Surgical History:   Past Surgical History:   Procedure Laterality Date    ABDOMINOPLASTY  2019    With flank liposuction     SECTION      HIDRADENITIS EXCISION      Drainage    INCISION AND DRAINAGE ABSCESS      INCISION AND DRAINAGE OF WOUND Bilateral 2023    Procedure: EXCISION OF HYDRAADENITIS RIGHT GROIN AND LEFT AXILLA;  Surgeon: Young Sanchez MD;  [place] [facility] [place] [facility] [place]: [place];  Laterality: Bilateral;    PILONIDAL CYSTECTOMY         Family History:   Family History   Problem Relation Age of Onset    Kidney nephrosis Father     Kidney nephrosis Maternal Grandmother     Diabetes Other     Heart disease Other     Malig Hyperthermia Neg Hx        Social History:   Social History     Socioeconomic History    Marital status:    Tobacco Use    Smoking status: Never     Passive exposure: Never    Smokeless tobacco: Never   Vaping Use    Vaping status: Never Used   Substance and Sexual Activity    Alcohol use: Never    Drug use: Never    Sexual activity: Defer       Medications:     Current Outpatient Medications:     amLODIPine (NORVASC) 5 MG tablet, Take 1 tablet by mouth Daily., Disp: 90 tablet, Rfl: 1    Ashlyna 0.15-0.03 &0.01 MG, Take 1 tablet by mouth Daily., Disp: 90 tablet, Rfl: 3    ferrous sulfate 325 (65 FE) MG EC tablet, TAKE 1 TABLET BY MOUTH DAILY WITH BREAKFAST, Disp: 90 tablet, Rfl: 1    Fluocinolone Acetonide Scalp 0.01 % oil, Apply 1 Application topically Daily., Disp: 118.28 mL, Rfl: 5    Hyrimoz 40 MG/0.4ML solution auto-injector, , Disp: , Rfl:     ibuprofen (ADVIL,MOTRIN) 600 MG tablet, Take 1 tablet by mouth Every 8  "(Eight) Hours As Needed for Moderate Pain., Disp: 30 tablet, Rfl: 0    vitamin C (ASCORBIC ACID) 250 MG tablet, TAKE 1 TABLET BY MOUTH DAILY, Disp: 90 tablet, Rfl: 1    vitamin D (ERGOCALCIFEROL) 1.25 MG (10667 UT) capsule capsule, Take 1 capsule by mouth 1 (One) Time Per Week., Disp: 13 capsule, Rfl: 1    amoxicillin-clavulanate (AUGMENTIN) 875-125 MG per tablet, Take 1 tablet by mouth 2 (Two) Times a Day for 7 days., Disp: 14 tablet, Rfl: 0    cetirizine (zyrTEC) 10 MG tablet, Take 1 tablet by mouth Every Night., Disp: 90 tablet, Rfl: 1    Semaglutide-Weight Management (Wegovy) 0.5 MG/0.5ML solution auto-injector, Inject 0.5 mL under the skin into the appropriate area as directed 1 (One) Time Per Week., Disp: 2 mL, Rfl: 0    Semaglutide-Weight Management 0.25 MG/0.5ML solution auto-injector, Inject 0.5 mL under the skin into the appropriate area as directed 1 (One) Time Per Week., Disp: 2 mL, Rfl: 0    triamcinolone (KENALOG) 0.1 % paste, Apply  to teeth 2 (Two) Times a Day., Disp: 5 g, Rfl: 1  No current facility-administered medications for this visit.    Allergies:   Allergies   Allergen Reactions    Sulfamethoxazole-Trimethoprim Hives    Lisinopril Hives           Objective     Physical Exam:  Vital Signs:   Vitals:    06/06/25 1123 06/06/25 1155   BP: 134/72 126/61   Pulse: 85    Temp: 98 °F (36.7 °C)    SpO2: 100%    Weight: 70.5 kg (155 lb 6.4 oz)    Height: 162.6 cm (64\")    PainSc: 6     PainLoc: Mouth      Body mass index is 26.67 kg/m².           Physical Exam  HENT:      Right Ear: A middle ear effusion is present.      Left Ear: A middle ear effusion is present.      Nose: Congestion and rhinorrhea present.      Right Turbinates: Enlarged and swollen.      Left Turbinates: Enlarged and swollen.      Left Sinus: Maxillary sinus tenderness present.   Eyes:      Conjunctiva/sclera:      Right eye: Right conjunctiva is injected.      Left eye: Left conjunctiva is injected.   Cardiovascular:      Rate and " Rhythm: Normal rate and regular rhythm.      Heart sounds: Normal heart sounds. No murmur heard.            Assessment / Plan      Assessment/Plan:   Diagnoses and all orders for this visit:    1. Annual physical exam (Primary)    2. Stage 1 chronic kidney disease  -     Comprehensive Metabolic Panel    3. Primary hypertension  -     UA / M With / Rflx Culture([facility] ONLY) - Urine, Clean Catch; Future  -     UA / M With / Rflx Culture([facility] ONLY) - Urine, Clean Catch    4. Mixed hyperlipidemia  -     Lipid Panel    5. Vitamin D deficiency    6. Iron deficiency  -     CBC Auto Differential  -     Iron Profile w/o Ferritin  -     Ferritin    7. Impaired fasting blood sugar  -     Comprehensive Metabolic Panel  -     Hemoglobin A1c  -     UA / M With / Rflx Culture([facility] ONLY) - Urine, Clean Catch; Future  -     UA / M With / Rflx Culture([facility] ONLY) - Urine, Clean Catch    8. Screening for thyroid disorder  -     TSH    9. Fatigue, unspecified type  -     Vitamin B12    10. Overweight (BMI 25.0-29.9)    11. Acute non-recurrent sinusitis, unspecified location  -     methylPREDNISolone acetate (DEPO-medrol) injection 40 mg    12. Aphthous ulcer of mouth    13. Seasonal allergies    Other orders  -     Semaglutide-Weight Management (Wegovy) 0.5 MG/0.5ML solution auto-injector; Inject 0.5 mL under the skin into the appropriate area as directed 1 (One) Time Per Week.  Dispense: 2 mL; Refill: 0  -     cetirizine (zyrTEC) 10 MG tablet; Take 1 tablet by mouth Every Night.  Dispense: 90 tablet; Refill: 1  -     amoxicillin-clavulanate (AUGMENTIN) 875-125 MG per tablet; Take 1 tablet by mouth 2 (Two) Times a Day for 7 days.  Dispense: 14 tablet; Refill: 0  -     vitamin D (ERGOCALCIFEROL) 1.25 MG (83016 UT) capsule capsule; Take 1 capsule by mouth 1 (One) Time Per Week.  Dispense: 13 capsule; Refill: 1  -     triamcinolone (KENALOG) 0.1 % paste; Apply  to teeth 2 (Two) Times a Day.  Dispense: 5 g; Refill: 1  -      "Semaglutide-Weight Management 0.25 MG/0.5ML solution auto-injector; Inject 0.5 mL under the skin into the appropriate area as directed 1 (One) Time Per Week.  Dispense: 2 mL; Refill: 0       Annual physical exam immunizations screening reviewed with patient mammogram up-to-date Pap smear up-to-date discussed screening colonoscopy start age 47 unless signs symptoms recur  Stage I chronic kidney disease M CMP to monitor  Hypertension currently controlled with amlodipine  Hyperlipidemia obtain lipid   Vitamin D deficiency will obtain labs to monitor current taking 50,000's once weekly  Iron deficiency currently taking ferrous sulfate will obtain labs monitor  Impaired fasting glucose obtain hemoglobin A1c to monitor  Fatigue obtain B12 levels  Acute sinusitis will treat with Augmentin Depo-Medrol 40  Ulcer of mouth will treat with Kenalog dental paste  Seasonal allergies start Zyrtec if symptoms persist would add Flonase  Overweight with continued weight maintenance on Zepbound patient's insurance no longer covering will transition to Wegovy dosing instructions provided to patient continue to increase exercise to 30 minutes daily avoiding added carbs sugars      Follow Up:   No follow-ups on file.    Davi Hirsch, DOMINGO    \"Please note that portions of this note were completed with a voice recognition program.\"     "

## 2025-06-06 ENCOUNTER — OFFICE VISIT (OUTPATIENT)
Dept: FAMILY MEDICINE CLINIC | Facility: CLINIC | Age: 45
End: 2025-06-06
Payer: COMMERCIAL

## 2025-06-06 VITALS
SYSTOLIC BLOOD PRESSURE: 126 MMHG | DIASTOLIC BLOOD PRESSURE: 61 MMHG | WEIGHT: 155.4 LBS | BODY MASS INDEX: 26.53 KG/M2 | HEART RATE: 85 BPM | TEMPERATURE: 98 F | OXYGEN SATURATION: 100 % | HEIGHT: 64 IN

## 2025-06-06 DIAGNOSIS — R53.83 FATIGUE, UNSPECIFIED TYPE: ICD-10-CM

## 2025-06-06 DIAGNOSIS — J30.2 SEASONAL ALLERGIES: ICD-10-CM

## 2025-06-06 DIAGNOSIS — E61.1 IRON DEFICIENCY: ICD-10-CM

## 2025-06-06 DIAGNOSIS — E55.9 VITAMIN D DEFICIENCY: ICD-10-CM

## 2025-06-06 DIAGNOSIS — R73.01 IMPAIRED FASTING BLOOD SUGAR: ICD-10-CM

## 2025-06-06 DIAGNOSIS — I10 PRIMARY HYPERTENSION: ICD-10-CM

## 2025-06-06 DIAGNOSIS — J01.90 ACUTE NON-RECURRENT SINUSITIS, UNSPECIFIED LOCATION: ICD-10-CM

## 2025-06-06 DIAGNOSIS — Z13.29 SCREENING FOR THYROID DISORDER: ICD-10-CM

## 2025-06-06 DIAGNOSIS — N18.1 STAGE 1 CHRONIC KIDNEY DISEASE: ICD-10-CM

## 2025-06-06 DIAGNOSIS — K12.0 APHTHOUS ULCER OF MOUTH: ICD-10-CM

## 2025-06-06 DIAGNOSIS — E78.2 MIXED HYPERLIPIDEMIA: ICD-10-CM

## 2025-06-06 DIAGNOSIS — Z00.00 ANNUAL PHYSICAL EXAM: Primary | ICD-10-CM

## 2025-06-06 DIAGNOSIS — E66.3 OVERWEIGHT (BMI 25.0-29.9): ICD-10-CM

## 2025-06-06 PROBLEM — E66.09 CLASS 1 OBESITY DUE TO EXCESS CALORIES WITH SERIOUS COMORBIDITY AND BODY MASS INDEX (BMI) OF 32.0 TO 32.9 IN ADULT: Status: RESOLVED | Noted: 2023-12-01 | Resolved: 2025-06-06

## 2025-06-06 PROBLEM — E66.811 CLASS 1 OBESITY DUE TO EXCESS CALORIES WITH SERIOUS COMORBIDITY AND BODY MASS INDEX (BMI) OF 32.0 TO 32.9 IN ADULT: Status: RESOLVED | Noted: 2023-12-01 | Resolved: 2025-06-06

## 2025-06-06 LAB
ALBUMIN SERPL-MCNC: 3.7 G/DL (ref 3.5–5.2)
ALBUMIN/GLOB SERPL: 0.7 G/DL
ALP SERPL-CCNC: 72 U/L (ref 39–117)
ALT SERPL W P-5'-P-CCNC: 13 U/L (ref 1–33)
ANION GAP SERPL CALCULATED.3IONS-SCNC: 9.7 MMOL/L (ref 5–15)
AST SERPL-CCNC: 9 U/L (ref 1–32)
BASOPHILS # BLD AUTO: 0.06 10*3/MM3 (ref 0–0.2)
BASOPHILS NFR BLD AUTO: 0.5 % (ref 0–1.5)
BILIRUB SERPL-MCNC: 0.2 MG/DL (ref 0–1.2)
BUN SERPL-MCNC: 7 MG/DL (ref 6–20)
BUN/CREAT SERPL: 6.5 (ref 7–25)
CALCIUM SPEC-SCNC: 9.8 MG/DL (ref 8.6–10.5)
CHLORIDE SERPL-SCNC: 103 MMOL/L (ref 98–107)
CHOLEST SERPL-MCNC: 185 MG/DL (ref 0–200)
CO2 SERPL-SCNC: 22.3 MMOL/L (ref 22–29)
CREAT SERPL-MCNC: 1.08 MG/DL (ref 0.57–1)
DEPRECATED RDW RBC AUTO: 38.4 FL (ref 37–54)
EGFRCR SERPLBLD CKD-EPI 2021: 65.9 ML/MIN/1.73
EOSINOPHIL # BLD AUTO: 0.32 10*3/MM3 (ref 0–0.4)
EOSINOPHIL NFR BLD AUTO: 2.7 % (ref 0.3–6.2)
ERYTHROCYTE [DISTWIDTH] IN BLOOD BY AUTOMATED COUNT: 12.5 % (ref 12.3–15.4)
FERRITIN SERPL-MCNC: 142 NG/ML (ref 13–150)
GLOBULIN UR ELPH-MCNC: 5.1 GM/DL
GLUCOSE SERPL-MCNC: 78 MG/DL (ref 65–99)
HBA1C MFR BLD: 5.2 % (ref 4.8–5.6)
HCT VFR BLD AUTO: 30.2 % (ref 34–46.6)
HDLC SERPL-MCNC: 33 MG/DL (ref 40–60)
HGB BLD-MCNC: 10 G/DL (ref 12–15.9)
IMM GRANULOCYTES # BLD AUTO: 0.05 10*3/MM3 (ref 0–0.05)
IMM GRANULOCYTES NFR BLD AUTO: 0.4 % (ref 0–0.5)
IRON 24H UR-MRATE: 45 MCG/DL (ref 37–145)
IRON SATN MFR SERPL: 16 % (ref 20–50)
LDLC SERPL CALC-MCNC: 138 MG/DL (ref 0–100)
LDLC/HDLC SERPL: 4.16 {RATIO}
LYMPHOCYTES # BLD AUTO: 3.76 10*3/MM3 (ref 0.7–3.1)
LYMPHOCYTES NFR BLD AUTO: 31.6 % (ref 19.6–45.3)
MCH RBC QN AUTO: 27.9 PG (ref 26.6–33)
MCHC RBC AUTO-ENTMCNC: 33.1 G/DL (ref 31.5–35.7)
MCV RBC AUTO: 84.1 FL (ref 79–97)
MONOCYTES # BLD AUTO: 0.77 10*3/MM3 (ref 0.1–0.9)
MONOCYTES NFR BLD AUTO: 6.5 % (ref 5–12)
NEUTROPHILS NFR BLD AUTO: 58.3 % (ref 42.7–76)
NEUTROPHILS NFR BLD AUTO: 6.94 10*3/MM3 (ref 1.7–7)
NRBC BLD AUTO-RTO: 0 /100 WBC (ref 0–0.2)
PLATELET # BLD AUTO: 495 10*3/MM3 (ref 140–450)
PMV BLD AUTO: 9.2 FL (ref 6–12)
POTASSIUM SERPL-SCNC: 4 MMOL/L (ref 3.5–5.2)
PROT SERPL-MCNC: 8.8 G/DL (ref 6–8.5)
RBC # BLD AUTO: 3.59 10*6/MM3 (ref 3.77–5.28)
SODIUM SERPL-SCNC: 135 MMOL/L (ref 136–145)
TIBC SERPL-MCNC: 283 MCG/DL (ref 298–536)
TRANSFERRIN SERPL-MCNC: 190 MG/DL (ref 200–360)
TRIGL SERPL-MCNC: 73 MG/DL (ref 0–150)
TSH SERPL DL<=0.05 MIU/L-ACNC: 0.86 UIU/ML (ref 0.27–4.2)
VIT B12 BLD-MCNC: 404 PG/ML (ref 211–946)
VLDLC SERPL-MCNC: 14 MG/DL (ref 5–40)
WBC NRBC COR # BLD AUTO: 11.9 10*3/MM3 (ref 3.4–10.8)

## 2025-06-06 PROCEDURE — 84466 ASSAY OF TRANSFERRIN: CPT | Performed by: NURSE PRACTITIONER

## 2025-06-06 PROCEDURE — 83540 ASSAY OF IRON: CPT | Performed by: NURSE PRACTITIONER

## 2025-06-06 PROCEDURE — 82728 ASSAY OF FERRITIN: CPT | Performed by: NURSE PRACTITIONER

## 2025-06-06 PROCEDURE — 80050 GENERAL HEALTH PANEL: CPT | Performed by: NURSE PRACTITIONER

## 2025-06-06 PROCEDURE — 80061 LIPID PANEL: CPT | Performed by: NURSE PRACTITIONER

## 2025-06-06 PROCEDURE — 81003 URINALYSIS AUTO W/O SCOPE: CPT | Performed by: NURSE PRACTITIONER

## 2025-06-06 PROCEDURE — 83036 HEMOGLOBIN GLYCOSYLATED A1C: CPT | Performed by: NURSE PRACTITIONER

## 2025-06-06 PROCEDURE — 82607 VITAMIN B-12: CPT | Performed by: NURSE PRACTITIONER

## 2025-06-06 RX ORDER — ERGOCALCIFEROL 1.25 MG/1
50000 CAPSULE, LIQUID FILLED ORAL WEEKLY
Qty: 13 CAPSULE | Refills: 1 | Status: SHIPPED | OUTPATIENT
Start: 2025-06-06

## 2025-06-06 RX ORDER — METHYLPREDNISOLONE ACETATE 40 MG/ML
40 INJECTION, SUSPENSION INTRA-ARTICULAR; INTRALESIONAL; INTRAMUSCULAR; SOFT TISSUE ONCE
Status: COMPLETED | OUTPATIENT
Start: 2025-06-06 | End: 2025-06-06

## 2025-06-06 RX ORDER — TRIAMCINOLONE ACETONIDE 0.1 %
PASTE (GRAM) DENTAL 2 TIMES DAILY
Qty: 5 G | Refills: 1 | Status: SHIPPED | OUTPATIENT
Start: 2025-06-06

## 2025-06-06 RX ORDER — SEMAGLUTIDE 0.5 MG/.5ML
0.5 INJECTION, SOLUTION SUBCUTANEOUS WEEKLY
Qty: 2 ML | Refills: 0 | Status: SHIPPED | OUTPATIENT
Start: 2025-06-06

## 2025-06-06 RX ORDER — CETIRIZINE HYDROCHLORIDE 10 MG/1
10 TABLET ORAL NIGHTLY
Qty: 90 TABLET | Refills: 1 | Status: SHIPPED | OUTPATIENT
Start: 2025-06-06

## 2025-06-06 RX ADMIN — METHYLPREDNISOLONE ACETATE 40 MG: 40 INJECTION, SUSPENSION INTRA-ARTICULAR; INTRALESIONAL; INTRAMUSCULAR; SOFT TISSUE at 11:55

## 2025-06-07 LAB
APPEARANCE UR: CLEAR
BACTERIA #/AREA URNS HPF: NORMAL /[HPF]
BILIRUB UR QL STRIP: NEGATIVE
CASTS URNS QL MICRO: NORMAL /LPF
COLOR UR: YELLOW
EPI CELLS #/AREA URNS HPF: NORMAL /HPF (ref 0–10)
GLUCOSE UR QL STRIP: NEGATIVE
HGB UR QL STRIP: NEGATIVE
KETONES UR QL STRIP: NEGATIVE
LEUKOCYTE ESTERASE UR QL STRIP: NEGATIVE
MICRO URNS: NORMAL
MICRO URNS: NORMAL
NITRITE UR QL STRIP: NEGATIVE
PH UR STRIP: 7 [PH] (ref 5–7.5)
PROT UR QL STRIP: NEGATIVE
RBC #/AREA URNS HPF: NORMAL /HPF (ref 0–2)
SP GR UR STRIP: 1.01 (ref 1–1.03)
URINALYSIS REFLEX: NORMAL
UROBILINOGEN UR STRIP-MCNC: 0.2 MG/DL (ref 0.2–1)
WBC #/AREA URNS HPF: NORMAL /HPF (ref 0–5)

## 2025-06-10 ENCOUNTER — RESULTS FOLLOW-UP (OUTPATIENT)
Dept: FAMILY MEDICINE CLINIC | Facility: CLINIC | Age: 45
End: 2025-06-10
Payer: COMMERCIAL

## 2025-06-10 DIAGNOSIS — D72.829 LEUKOCYTOSIS, UNSPECIFIED TYPE: Primary | ICD-10-CM

## 2025-06-10 RX ORDER — ERGOCALCIFEROL 1.25 MG/1
50000 CAPSULE, LIQUID FILLED ORAL WEEKLY
Qty: 13 CAPSULE | Refills: 1 | Status: SHIPPED | OUTPATIENT
Start: 2025-06-10

## 2025-06-10 RX ORDER — MULTIVIT WITH MINERALS/LUTEIN
250 TABLET ORAL DAILY
Qty: 90 TABLET | Refills: 1 | Status: SHIPPED | OUTPATIENT
Start: 2025-06-10

## 2025-06-10 RX ORDER — FERROUS SULFATE 325(65) MG
325 TABLET, DELAYED RELEASE (ENTERIC COATED) ORAL
Qty: 90 TABLET | Refills: 1 | Status: SHIPPED | OUTPATIENT
Start: 2025-06-10

## 2025-07-14 RX ORDER — SEMAGLUTIDE 0.5 MG/.5ML
0.5 INJECTION, SOLUTION SUBCUTANEOUS WEEKLY
Qty: 2 ML | Refills: 0 | Status: CANCELLED | OUTPATIENT
Start: 2025-07-14

## 2025-07-14 RX ORDER — SEMAGLUTIDE 1 MG/.5ML
1 INJECTION, SOLUTION SUBCUTANEOUS WEEKLY
Qty: 2 ML | Refills: 0 | Status: SHIPPED | OUTPATIENT
Start: 2025-07-14

## 2025-08-14 DIAGNOSIS — E66.3 OVERWEIGHT (BMI 25.0-29.9): Primary | ICD-10-CM

## 2025-08-14 RX ORDER — SEMAGLUTIDE 1.7 MG/.75ML
1.7 INJECTION, SOLUTION SUBCUTANEOUS WEEKLY
Qty: 3 ML | Refills: 0 | Status: SHIPPED | OUTPATIENT
Start: 2025-08-14

## (undated) DEVICE — SOL IRR NACL 0.9PCT BT 1000ML

## (undated) DEVICE — STERILE POLYISOPRENE POWDER-FREE SURGICAL GLOVES WITH EMOLLIENT COATING: Brand: PROTEXIS

## (undated) DEVICE — PENCL E/S SMOKEEVAC W/TELESCP CANN

## (undated) DEVICE — STRAP STIRUP WO/ RNG

## (undated) DEVICE — SKIN PREP TRAY W/CHG: Brand: MEDLINE INDUSTRIES, INC.

## (undated) DEVICE — DRAPE,UNDERBUTTOCKS,PCH,STERILE: Brand: MEDLINE

## (undated) DEVICE — DRSNG PAD ABD 8X10IN STRL

## (undated) DEVICE — GLV SURG SENSICARE PI ORTHO SZ8 LF STRL

## (undated) DEVICE — MINOR-LF: Brand: MEDLINE INDUSTRIES, INC.

## (undated) DEVICE — GOWN,REINFRCE,POLY,SIRUS,BREATH SLV,XXLG: Brand: MEDLINE

## (undated) DEVICE — STRIP PACKING W IODOFORM 1/4

## (undated) DEVICE — INTENDED FOR TISSUE SEPARATION, AND OTHER PROCEDURES THAT REQUIRE A SHARP SURGICAL BLADE TO PUNCTURE OR CUT.: Brand: BARD-PARKER ® CARBON RIB-BACK BLADES

## (undated) DEVICE — ELECTRD BLD EDGE COAT 3IN

## (undated) DEVICE — BRIEF KNIT SEAMLSS PREM 70IN 3XL PK/2

## (undated) DEVICE — SLV SCD KN/LEN ADJ EXPRSS BLENDED MD 1P/U

## (undated) DEVICE — LEGGINGS, PAIR, CLEAR, STERILE: Brand: MEDLINE